# Patient Record
Sex: FEMALE | Race: WHITE | NOT HISPANIC OR LATINO | Employment: UNEMPLOYED | URBAN - METROPOLITAN AREA
[De-identification: names, ages, dates, MRNs, and addresses within clinical notes are randomized per-mention and may not be internally consistent; named-entity substitution may affect disease eponyms.]

---

## 2019-01-01 LAB — PAP SMEAR - HIM PATIENT REPORTED: NEGATIVE

## 2019-07-18 ENCOUNTER — TRANSFERRED RECORDS (OUTPATIENT)
Dept: HEALTH INFORMATION MANAGEMENT | Facility: CLINIC | Age: 49
End: 2019-07-18

## 2020-07-01 LAB — NORMAL RANGE: NORMAL

## 2020-08-11 ENCOUNTER — TRANSFERRED RECORDS (OUTPATIENT)
Dept: HEALTH INFORMATION MANAGEMENT | Facility: CLINIC | Age: 50
End: 2020-08-11

## 2021-05-24 ENCOUNTER — RECORDS - HEALTHEAST (OUTPATIENT)
Dept: ADMINISTRATIVE | Facility: CLINIC | Age: 51
End: 2021-05-24

## 2021-05-26 ENCOUNTER — RECORDS - HEALTHEAST (OUTPATIENT)
Dept: ADMINISTRATIVE | Facility: CLINIC | Age: 51
End: 2021-05-26

## 2021-06-04 LAB — COLONOSCOPY: NORMAL

## 2021-07-05 ENCOUNTER — OFFICE VISIT (OUTPATIENT)
Dept: FAMILY MEDICINE | Facility: CLINIC | Age: 51
End: 2021-07-05
Payer: COMMERCIAL

## 2021-07-05 VITALS
OXYGEN SATURATION: 98 % | WEIGHT: 103.04 LBS | TEMPERATURE: 97.2 F | RESPIRATION RATE: 12 BRPM | HEART RATE: 70 BPM | SYSTOLIC BLOOD PRESSURE: 99 MMHG | BODY MASS INDEX: 17.17 KG/M2 | HEIGHT: 65 IN | DIASTOLIC BLOOD PRESSURE: 64 MMHG

## 2021-07-05 DIAGNOSIS — Z12.31 ENCOUNTER FOR SCREENING MAMMOGRAM FOR BREAST CANCER: ICD-10-CM

## 2021-07-05 DIAGNOSIS — Z00.00 ROUTINE GENERAL MEDICAL EXAMINATION AT A HEALTH CARE FACILITY: Primary | ICD-10-CM

## 2021-07-05 DIAGNOSIS — Z13.220 SCREENING FOR LIPID DISORDERS: ICD-10-CM

## 2021-07-05 DIAGNOSIS — L30.9 DERMATITIS: ICD-10-CM

## 2021-07-05 PROBLEM — F41.9 ANXIETY: Status: ACTIVE | Noted: 2021-07-05

## 2021-07-05 LAB
% GRANULOCYTES: 58.1 %G (ref 40–75)
ANION GAP SERPL CALCULATED.3IONS-SCNC: 4 MMOL/L (ref 3–14)
BUN SERPL-MCNC: 16 MG/DL (ref 7–30)
CALCIUM SERPL-MCNC: 9.1 MG/DL (ref 8.5–10.1)
CHLORIDE SERPL-SCNC: 102 MMOL/L (ref 94–109)
CHOLEST SERPL-MCNC: 174 MG/DL (ref 0–200)
CHOLEST/HDLC SERPL: 2.3 {RATIO} (ref 0–5)
CO2 SERPL-SCNC: 29 MMOL/L (ref 20–32)
CREAT SERPL-MCNC: 0.68 MG/DL (ref 0.52–1.04)
ERYTHROCYTE [DISTWIDTH] IN BLOOD BY AUTOMATED COUNT: 12.1 %
FASTING SPECIMEN: NO
GFR SERPL CREATININE-BSD FRML MDRD: >90 ML/MIN/{1.73_M2}
GLUCOSE SERPL-MCNC: 80 MG/DL (ref 70–99)
GRANULOCYTES #: 3.9 K/UL (ref 1.6–8.3)
HCT VFR BLD AUTO: 37 % (ref 35–47)
HDLC SERPL-MCNC: 76 MG/DL
HEMOGLOBIN: 12.4 G/DL (ref 11.7–15.7)
LDLC SERPL CALC-MCNC: 77 MG/DL (ref 0–129)
LYMPHOCYTES # BLD AUTO: 1.9 K/UL (ref 0.8–5.3)
LYMPHOCYTES NFR BLD AUTO: 27.8 %L (ref 20–48)
MCH RBC QN AUTO: 31.3 PG (ref 26.5–35)
MCHC RBC AUTO-ENTMCNC: 33.5 G/DL (ref 32–36)
MCV RBC AUTO: 93.4 FL (ref 78–100)
MID #: 0.9 K/UL (ref 0–2.2)
MID %: 14.1 %M (ref 0–20)
PLATELET # BLD AUTO: 258 K/UL (ref 150–450)
POTASSIUM SERPL-SCNC: 4.1 MMOL/L (ref 3.4–5.3)
RBC # BLD AUTO: 3.96 M/UL (ref 3.8–5.2)
SODIUM SERPL-SCNC: 135 MMOL/L (ref 133–144)
TRIGL SERPL-MCNC: 105 MG/DL (ref 0–150)
TSH SERPL DL<=0.005 MIU/L-ACNC: 1.85 MU/L (ref 0.4–4)
VLDL-CHOLESTEROL: 21 (ref 7–32)
WBC # BLD AUTO: 6.7 K/UL (ref 4–11)

## 2021-07-05 RX ORDER — DIAZEPAM 5 MG
5 TABLET ORAL EVERY 6 HOURS PRN
COMMUNITY
End: 2023-09-18

## 2021-07-05 RX ORDER — FLUOXETINE 20 MG/1
20 TABLET, FILM COATED ORAL DAILY
COMMUNITY
End: 2022-07-11

## 2021-07-05 RX ORDER — MECLIZINE HYDROCHLORIDE 25 MG/1
25 TABLET ORAL 3 TIMES DAILY PRN
COMMUNITY
End: 2022-03-30

## 2021-07-05 ASSESSMENT — PAIN SCALES - GENERAL: PAINLEVEL: NO PAIN (0)

## 2021-07-05 ASSESSMENT — MIFFLIN-ST. JEOR: SCORE: 1078.27

## 2021-07-05 NOTE — PROGRESS NOTES
SUBJECTIVE:   CC: Bette Celeste is an 50 year old woman who presents for preventive health visit.     Patient has been advised of split billing requirements and indicates understanding: Yes  Healthy Habits:    Do you get at least three servings of calcium containing foods daily (dairy, green leafy vegetables, etc.)? yes    Amount of exercise or daily activities, outside of work: 3-5 day(s) per week    Problems taking medications regularly not applicable    Medication side effects: No    Have you had an eye exam in the past two years? yes    Do you see a dentist twice per year? yes    Do you have sleep apnea, excessive snoring or daytime drowsiness?no      PROBLEMS TO ADD ON...  Itchy scalp  - notes this was an issue back in [place]  - eventually determined she had lice on two separate occasions  - the lice was not initially identified and it sounds like there were other treatments tried including likely antifungals and steroids before if was determined that she had lice  - currently there is no evidence of lice  - no visible rash  - but the itching can be very frustrating    Today's PHQ-2 Score:   PHQ-2 ( 1999 [employer]) 7/5/2021   Q1: Little interest or pleasure in doing things 0   Q2: Feeling down, depressed or hopeless 0   PHQ-2 Score 0       Abuse: Current or Past(Physical, Sexual or Emotional)- No  Do you feel safe in your environment? Yes        Social History     Tobacco Use     Smoking status: Not on file   Substance Use Topics     Alcohol use: Not on file     If you drink alcohol do you typically have >3 drinks per day or >7 drinks per week? No                     Reviewed orders with patient.  Reviewed health maintenance and updated orders accordingly - Yes    Mammogram Screening: Recommended annual mammography  Pertinent mammograms are reviewed under the imaging tab.  Order for mammogram placed today.     Pertinent mammograms are reviewed under the imaging tab.  History of abnormal Pap smear: NO - age  "30-65 PAP every 5 years with negative HPV co-testing recommended  Per patient, her last PAP was negative in 2019. She does not know if co-testing for HPV was done at the time.      Reviewed and updated as needed this visit by clinical staff  Tobacco  Allergies  Meds  Problems  Med Hx  Surg Hx  Fam Hx  Soc Hx          Reviewed and updated as needed this visit by Provider  Tobacco  Allergies  Meds  Problems  Med Hx  Surg Hx  Fam Hx         Past Medical History:   Diagnosis Date     Arnold-Chiari malformation, type II (H)      Vertigo       Past Surgical History:   Procedure Laterality Date     BRAIN SURGERY  1990    Arnold Chiari correction in 1990 and later hernia repair in 2008       ROS:  CONSTITUTIONAL: NEGATIVE for fever, chills, change in weight  INTEGUMENTARY/SKIN: Itching scalp as noted above. Otherwise NEGATIVE for worrisome rashes, moles or lesions  EYES: NEGATIVE for vision changes or irritation  ENT: NEGATIVE for ear, mouth and throat problems  RESP: NEGATIVE for significant cough or SOB  BREAST: NEGATIVE for masses, tenderness or discharge  CV: NEGATIVE for chest pain, palpitations or peripheral edema  GI: NEGATIVE for nausea, abdominal pain, heartburn, or change in bowel habits  : NEGATIVE for unusual urinary or vaginal symptoms. No vaginal bleeding.  MUSCULOSKELETAL: NEGATIVE for significant arthralgias or myalgia  NEURO: NEGATIVE for weakness, dizziness or paresthesias  PSYCHIATRIC: NEGATIVE for changes in mood or affect     OBJECTIVE:   BP 99/64 (BP Location: Left arm, Patient Position: Chair, Cuff Size: Adult Regular)   Pulse 70   Temp 97.2  F (36.2  C) (Temporal)   Resp 12   Ht 1.651 m (5' 5\")   Wt 46.7 kg (103 lb 0.6 oz)   LMP 05/01/2019   SpO2 98%   Breastfeeding No   BMI 17.15 kg/m    EXAM:  GENERAL APPEARANCE: healthy, alert and no distress  EYES: Eyes grossly normal to inspection, PERRL and conjunctivae and sclerae normal  HENT: ear canals and TM's normal, nose and " mouth without ulcers or lesions, oropharynx clear and oral mucous membranes moist  NECK: no adenopathy, no asymmetry, masses, or scars and thyroid normal to palpation  RESP: lungs clear to auscultation - no rales, rhonchi or wheezes  BREAST: normal without masses, tenderness or nipple discharge and no palpable axillary masses or adenopathy  CV: regular rate and rhythm, normal S1 S2, no S3 or S4, no murmur, click or rub, no peripheral edema and peripheral pulses strong  ABDOMEN: soft, nontender, no hepatosplenomegaly, no masses and bowel sounds normal  MS: no musculoskeletal defects are noted and gait is age appropriate without ataxia  SKIN: no suspicious lesions or rashes  NEURO: Normal strength and tone, sensory exam grossly normal, mentation intact and speech normal  PSYCH: mentation appears normal and affect normal/bright    Diagnostic Test Results:  Labs reviewed in Epic    ASSESSMENT/PLAN:   Bette was seen today for new patient, physical and derm problem.    Diagnoses and all orders for this visit:    Routine general medical examination at a health care facility  -     CBC with Diff Plt ([facility])  -     TSH with free T4 reflex    Screening for lipid disorders  -     Lipid Panel ([facility])  -     Basic metabolic panel    Encounter for screening mammogram for breast cancer  -     Mammogram - routine screening; Future    Dermatitis  -     ADULT DERMATOLOGY REFERRAL; Future    Scalp does not appear particularly inflamed today although patient does endorse feeling itchy. History or previous confusion with diagnosis and eventual diagnosis of lice x2 without apparent spread of infection to family member is curious. I considered possible trial of an azole +/- steroid but will defer to dermatology at this point.     Patient has been advised of split billing requirements and indicates understanding: Yes  COUNSELING:   Reviewed preventive health counseling, as reflected in patient instructions    Estimated body mass index is  "17.15 kg/m  as calculated from the following:    Height as of this encounter: 1.651 m (5' 5\").    Weight as of this encounter: 46.7 kg (103 lb 0.6 oz).    She reports that she has never smoked. She has never used smokeless tobacco.      Counseling Resources:  ATP IV Guidelines  Pooled Cohorts Equation Calculator  Breast Cancer Risk Calculator  BRCA-Related Cancer Risk Assessment: FHS-7 Tool  FRAX Risk Assessment  ICSI Preventive Guidelines  Dietary Guidelines for Americans, 2010  USDA's MyPlate  ASA Prophylaxis  Lung CA Screening    Alexis Chatman MD  [facility]  "

## 2021-07-05 NOTE — LETTER
July 6, 2021      Bette Celeste  [address] [address]        Bernardo Amos,     Here are the results from your recent labs in clinic. Everything looks good, I have no concerns based on these results. I didn't see anything to indicate why you might be feeling your scalp to be so itchy. I'll be curious to hear what dermatology has to say. Feel free to contact the clinic or you can contact me directly through the [employer] portal we talked about.     Alexis Gonzales MD   8:45 AM, July 6, 2021     Resulted Orders   Lipid Panel ([facility])   Result Value Ref Range    FASTING SPECIMEN no     Cholesterol 174.0 0.0 - 200.0    HDL Cholesterol 76.0 >50.0    Triglycerides 105.0 0.0 - 150.0    Cholesterol/HDL Ratio 2.3 0.0 - 5.0    LDL Cholesterol Direct 77.0 0.0 - 129.0    VLDL-Cholesterol 21.0 7.0 - 32.0   Basic metabolic panel   Result Value Ref Range    Sodium 135 133 - 144 mmol/L    Potassium 4.1 3.4 - 5.3 mmol/L    Chloride 102 94 - 109 mmol/L    Carbon Dioxide 29 20 - 32 mmol/L    Anion Gap 4 3 - 14 mmol/L    Glucose 80 70 - 99 mg/dL    Urea Nitrogen 16 7 - 30 mg/dL    Creatinine 0.68 0.52 - 1.04 mg/dL    GFR Estimate >90 >60 mL/min/[1.73_m2]      Comment:      Non  GFR Calc  Starting 12/18/2018, serum creatinine based estimated GFR (eGFR) will be   calculated using the Chronic Kidney Disease Epidemiology Collaboration   (CKD-EPI) equation.      GFR Estimate If Black >90 >60 mL/min/[1.73_m2]      Comment:       GFR Calc  Starting 12/18/2018, serum creatinine based estimated GFR (eGFR) will be   calculated using the Chronic Kidney Disease Epidemiology Collaboration   (CKD-EPI) equation.      Calcium 9.1 8.5 - 10.1 mg/dL   CBC with Diff Plt ([facility])   Result Value Ref Range    WBC 6.7 4.0 - 11.0 K/uL    Lymphocytes # 1.9 0.8 - 5.3 K/uL    % Lymphocytes 27.8 20.0 - 48.0 %L    Mid # 0.9 0.0 - 2.2 K/uL    Mid % 14.1 0.0 - 20.0 %M    GRANULOCYTES # 3.9 1.6 - 8.3 K/uL    %  Granulocytes 58.1 40.0 - 75.0 %G    RBC 3.96 3.80 - 5.20 M/uL    Hemoglobin 12.4 11.7 - 15.7 g/dL    Hematocrit 37.0 35.0 - 47.0 %    MCV 93.4 78.0 - 100.0 fL    MCH 31.3 26.5 - 35.0 pg    MCHC 33.5 32.0 - 36.0 g/dL    Platelets 258.0 150.0 - 450.0 K/uL    RDW 12.1 %   TSH with free T4 reflex   Result Value Ref Range    TSH 1.85 0.40 - 4.00 mU/L

## 2021-07-05 NOTE — NURSING NOTE
"50 year old  Chief Complaint   Patient presents with     New Patient     Physical     patient is not fasting.     Derm Problem     complains itchy scalp that has been an on going issues.       Blood pressure 99/64, pulse 70, temperature 97.2  F (36.2  C), temperature source Temporal, resp. rate 12, height 1.651 m (5' 5\"), weight 46.7 kg (103 lb 0.6 oz), last menstrual period 05/01/2019, SpO2 98 %, not currently breastfeeding. Body mass index is 17.15 kg/m .  There is no problem list on file for this patient.      Wt Readings from Last 2 Encounters:   07/05/21 46.7 kg (103 lb 0.6 oz)     BP Readings from Last 3 Encounters:   07/05/21 99/64         Current Outpatient Medications   Medication     B Complex Vitamins (B COMPLEX PO)     Calcium Citrate-Vitamin D (CALCIUM + D PO)     FLUoxetine 20 MG tablet     No current facility-administered medications for this visit.        Social History     Tobacco Use     Smoking status: Never Smoker     Smokeless tobacco: Never Used   Substance Use Topics     Alcohol use: Yes     Drug use: Never       Health Maintenance Due   Topic Date Due     PREVENTIVE CARE VISIT  Never done     ADVANCE CARE PLANNING  Never done     COLORECTAL CANCER SCREENING  Never done     HIV SCREENING  Never done     HEPATITIS C SCREENING  Never done     DTAP/TDAP/TD IMMUNIZATION (1 - Tdap) Never done     LIPID  Never done     ZOSTER IMMUNIZATION (1 of 2) Never done     MAMMO SCREENING  07/01/2021     PAP  01/01/2022       No results found for: PAP      Melvi Shipley CMA, CMA  July 5, 2021 1:12 PM  "

## 2021-08-01 ENCOUNTER — TRANSFERRED RECORDS (OUTPATIENT)
Dept: HEALTH INFORMATION MANAGEMENT | Facility: CLINIC | Age: 51
End: 2021-08-01

## 2021-08-05 ENCOUNTER — ANCILLARY PROCEDURE (OUTPATIENT)
Dept: MAMMOGRAPHY | Facility: CLINIC | Age: 51
End: 2021-08-05
Attending: FAMILY MEDICINE
Payer: COMMERCIAL

## 2021-08-05 DIAGNOSIS — Z12.31 ENCOUNTER FOR SCREENING MAMMOGRAM FOR BREAST CANCER: ICD-10-CM

## 2021-08-05 PROCEDURE — 77067 SCR MAMMO BI INCL CAD: CPT | Mod: GC

## 2021-08-05 PROCEDURE — 77063 BREAST TOMOSYNTHESIS BI: CPT | Mod: GC

## 2021-09-24 ENCOUNTER — TELEPHONE (OUTPATIENT)
Dept: GENERAL RADIOLOGY | Facility: CLINIC | Age: 51
End: 2021-09-24

## 2021-09-24 DIAGNOSIS — Z80.3 FAMILY HISTORY OF MALIGNANT NEOPLASM OF BREAST: Primary | ICD-10-CM

## 2021-09-24 NOTE — TELEPHONE ENCOUNTER
Received call from patient regarding high risk screening mammogram result letter. Discussed option to speak with genetic counseling or high risk cancer management clinic.

## 2021-09-26 ENCOUNTER — DOCUMENTATION ONLY (OUTPATIENT)
Dept: ONCOLOGY | Facility: CLINIC | Age: 51
End: 2021-09-26

## 2021-09-26 NOTE — PROGRESS NOTES
Writer received referral, reviewed for appropriate plan, and sent to New Patient Scheduling for completion.

## 2021-10-29 ENCOUNTER — OFFICE VISIT (OUTPATIENT)
Dept: FAMILY MEDICINE | Facility: CLINIC | Age: 51
End: 2021-10-29
Attending: FAMILY MEDICINE
Payer: COMMERCIAL

## 2021-10-29 VITALS — SYSTOLIC BLOOD PRESSURE: 98 MMHG | DIASTOLIC BLOOD PRESSURE: 58 MMHG

## 2021-10-29 DIAGNOSIS — L30.9 DERMATITIS: ICD-10-CM

## 2021-10-29 PROCEDURE — 99204 OFFICE O/P NEW MOD 45 MIN: CPT | Performed by: DERMATOLOGY

## 2021-10-29 RX ORDER — DESONIDE 0.5 MG/G
CREAM TOPICAL 2 TIMES DAILY
Qty: 60 G | Refills: 3 | Status: SHIPPED | OUTPATIENT
Start: 2021-10-29 | End: 2022-03-30

## 2021-10-29 RX ORDER — FLUOCINONIDE TOPICAL SOLUTION USP, 0.05% 0.5 MG/ML
SOLUTION TOPICAL 2 TIMES DAILY
Qty: 60 ML | Refills: 3 | Status: SHIPPED | OUTPATIENT
Start: 2021-10-29 | End: 2022-03-30

## 2021-10-29 NOTE — PROGRESS NOTES
[facility] Dermatology Clinic Note - EP    Encounter Date: Oct 29, 2021    Dermatology Problem List:  #. 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 Discussed natural etiology and prognosis of disease. Discussed various treatment options including capsaicin cream vs topical gabapentin vs topical steroid. Reassured that there are very few   - Start Lidex 0.05% solution BID for a few weeks at a time only    Procedures Performed:   None    Follow-up: 3 month(s) in-person, or earlier for new or changing lesions    Scribe Disclosure:  I, Marlene Glez, am serving as a scribe to document services personally performed by Jian Che MD based on data collection and the provider's statements to me.     Provider Disclosure:  The documentation recorded by the scribe accurately reflects the services I personally performed and the decisions made by me.    Staff:   Jian Che MD  Dermatology/Dermatopathology Staff Physician  , Department of Dermatology    ____________________________________________    CC: Derm Problem (Itchy neck and base of the skull area.)      HPI:  Ms. Bette Celeste is a(n) 50 year old female who presents today as a new patient for a rash. The patient was referred to dermatology by Dr. Chatman.     Today, the patient reports itching on her scalp for months. She states that this has been spreading down to her posterior neck more recently. Currently, she is using Aquafor occasionally. She has also previously used Vanicream and a medicated cream, although does not remember the name. There use to be more sores, though these are largely resolved. She is still noticing itching on the left side of her neck. This has  not previously been only on the left side. She underwent patch testing while in [place], although did not reveal any allergies.     Pertinent hx:  Chiari malformation type 2 s/p brain surgery in 1993. 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    Patient is otherwise feeling well, without additional skin concerns.     Labs Reviewed:  N/A    Physical Exam:  Vitals: BP 98/58   SKIN: Focused examination of scalp and posterior neck.  - Eczematous dermatits of left neck underneath jaw line.   - Rare excoriations on the left occipital scalp near the posterior hair line.  - No other lesions of concern on areas examined.     Medications:  Current Outpatient Medications   Medication     B Complex Vitamins (B COMPLEX PO)     Calcium Citrate-Vitamin D (CALCIUM + D PO)     diazepam (VALIUM) 5 MG tablet     FLUoxetine 20 MG tablet     meclizine (ANTIVERT) 25 MG tablet     No current facility-administered medications for this visit.        Past Medical History:   Patient Active Problem List   Diagnosis     Anxiety     Past Medical History:   Diagnosis Date     Arnold-Chiari malformation, type II (H)      Vertigo        CC Alexis Chatman MD  [address] [place] [address] on close of this encounter.    This note has been created using voice recognition software; while it has been reviewed, some errors may persist.

## 2021-10-29 NOTE — LETTER
10/29/2021         RE: Bette Celeste  [address]  [place] [address]        Dear Colleague,    Thank you for referring your patient, Bette Celeste, to the [facility]. Please see a copy of my visit note below.    [facility] Dermatology Clinic Note - EP    Encounter Date: Oct 29, 2021    Dermatology Problem List:  #. 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 Discussed natural etiology and prognosis of disease. Discussed various treatment options including capsaicin cream vs topical gabapentin vs topical steroid. Reassured that there are very few   - Start Lidex 0.05% solution BID for a few weeks at a time only    Procedures Performed:   None    Follow-up: 3 month(s) in-person, or earlier for new or changing lesions    Scribe Disclosure:  I, Marlene Glez, am serving as a scribe to document services personally performed by Jian Che MD based on data collection and the provider's statements to me.     Provider Disclosure:  The documentation recorded by the scribe accurately reflects the services I personally performed and the decisions made by me.    Staff:   Jian Che MD  Dermatology/Dermatopathology Staff Physician  , Department of Dermatology    ____________________________________________    CC: Derm Problem (Itchy neck and base of the skull area.)      HPI:  Ms. Bette Celeste is a(n) 50 year old female who presents today as a new patient for a rash. The patient was referred to dermatology by Dr. Chatman.     Today, the patient reports itching on her scalp for months. She states that this has been spreading down to her posterior neck more recently. Currently, she is  using Aquafor occasionally. She has also previously used Vanicream and a medicated cream, although does not remember the name. There use to be more sores, though these are largely resolved. She is still noticing itching on the left side of her neck. This has not previously been only on the left side. She underwent patch testing while in [place], although did not reveal any allergies.     Pertinent hx:  Chiari malformation type 2 s/p brain surgery in 1993. 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    Patient is otherwise feeling well, without additional skin concerns.     Labs Reviewed:  N/A    Physical Exam:  Vitals: BP 98/58   SKIN: Focused examination of scalp and posterior neck.  - Eczematous dermatits of left neck underneath jaw line.   - Rare excoriations on the left occipital scalp near the posterior hair line.  - No other lesions of concern on areas examined.     Medications:  Current Outpatient Medications   Medication     B Complex Vitamins (B COMPLEX PO)     Calcium Citrate-Vitamin D (CALCIUM + D PO)     diazepam (VALIUM) 5 MG tablet     FLUoxetine 20 MG tablet     meclizine (ANTIVERT) 25 MG tablet     No current facility-administered medications for this visit.        Past Medical History:   Patient Active Problem List   Diagnosis     Anxiety     Past Medical History:   Diagnosis Date     Arnold-Chiari malformation, type II (H)      Vertigo        CC Alexis Chatman MD  [address] [place] [address] on close of this encounter.    This note has been created using voice recognition software; while it has been reviewed, some errors may persist.         Again, thank you for allowing me to participate in the care of your patient.        Sincerely,        Jian Che MD

## 2021-11-19 ENCOUNTER — VIRTUAL VISIT (OUTPATIENT)
Dept: ONCOLOGY | Facility: CLINIC | Age: 51
End: 2021-11-19
Attending: FAMILY MEDICINE
Payer: COMMERCIAL

## 2021-11-19 DIAGNOSIS — Z80.3 FAMILY HISTORY OF MALIGNANT NEOPLASM OF BREAST: ICD-10-CM

## 2021-11-19 DIAGNOSIS — Z80.0 FAMILY HISTORY OF PANCREATIC CANCER: Primary | ICD-10-CM

## 2021-11-19 PROCEDURE — 96040 HC GENETIC COUNSELING, EACH 30 MINUTES: CPT | Mod: GT,95 | Performed by: GENETIC COUNSELOR, MS

## 2021-11-19 NOTE — PROGRESS NOTES
11/19/2021    Referring Provider: Alexis Chatman MD    Presenting Information:   I met with Bette Celeste today for genetic counseling at the Cancer Risk Management Program (video visit) to discuss her family history of breast and pancreatic cancer.  She is here today to review this history, cancer screening recommendations, and available genetic testing options.    Personal History:  Bette is a 50 year old female. She does not have any personal history of cancer.  She had her first menstrual period at age 13-14, her first child at age 34, and stopped having menstrual periods in May of 2019.  She has two biological children.  Bette has her ovaries, fallopian tubes and uterus in place.  Her most recent annual mammogram was 8/5/2021 and noted extreme densities.  Her most recent colonoscopy from June 2021 was negative, and follow up was recommended in 10 years.  She has routine eye exams, and has seen a dermatologist.  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       Family History: (Please see scanned pedigree for detailed family history information)    Her mother was diagnosed with pancreatic cancer and passed away in her late 70's    Her maternal aunt was diagnosed with breast cancer at age 39 and eye cancer in her 40's.  She completed BRCA1/2 genetic testing as part of a 25 gene hereditary cancer panel in 2014 which identified one variant of uncertain significance in the RAD51D gene.      Two maternal first cousins (through Bette's aunt who was diagnosed with breast cancer) also have a history of breast cancer.  One cousin was diagnosed with breast cancer at age 39, and has completed genetic testing in 2020 for a 53 gene panel which was negative.  Her sister, who was diagnosed with breast cancer at age 51, was found to carry a RAD51D variant of uncertain significance.     Her maternal grandmother was diagnosed with breast cancer in her 70's, and passed away at age 77    Bette's father had a history of  multiple myeloma diagnosed in his 70's    Her maternal ethnicity is [place]. Her paternal ethnicity is /English/[place].      Discussion:    Bette's family history of pancreatic and breast cancer is suggestive of a hereditary cancer syndrome.    We reviewed the features of sporadic, familial, and hereditary cancers. Based on her family history, Bette meets current National Comprehensive Cancer Network (NCCN) criteria for genetic testing of high penetrance breast and pancreatic cancer susceptibility genes.  Per NCCN guidelines, this testing includes BRCA1, BRCA2, PALB2, PTEN, TP53, CDH1, ISHA, CDKN2A, MLH1, MSH2, MSH6, EPCAM, STK11.        We discussed the natural history and genetics of hereditary breast and ovarian cancer syndrome caused by mutations in the BRCA1/2 genes. We discussed that there are additional genes that could cause increased risk for breast, pancreatic, and other cancers. As many of these genes present with overlapping features in a family and accurate cancer risk cannot always be established based upon the pedigree analysis alone, it would be reasonable for Bette to consider panel genetic testing to analyze multiple genes at once.    A detailed handout regarding hereditary breast cancer and the information we discussed was provided to Bette at the end of our appointment today and can be found in the after visit summary. Topics included: inheritance pattern, cancer risks, cancer screening recommendations, and also risks, benefits and limitations of testing.    We discussed that genetic testing for cancer susceptibility genes is typically most informative, though, when it is first performed on a family member with a personal history of  cancer. In this situation, we discussed that Bette's maternal aunt and cousins would be the best relatives to test first for breast cancer susceptibility genes.  Given their negative test results, this reduces the likelihood of detecting a breast  cancer susceptibility gene in Bette.  Due to her family history of pancreatic cancer and breast cancer, testing is available to Bette, but with limitations. If Bette pursues testing at this time and receives a negative result, this does not rule out the possibility of a hereditary cancer syndrome in her and/or her family.    Bette expressed interest in genetic testing today for a panel of genes related to the cancers in her family.  Verbal consent was obtained, and genetic testing for BRCA1/2 with the CustomNext-Cancer gene panel (CancerNext+MelanomaNext) will be sent through [facility]: APC, ISHA, AXIN2, BAP1, BARD1, BMPR1A, BRCA1, BRCA2, BRIP1, CDH1, CDK4, CDKN2A, CHEK2, DICER1, EPCAM, GREM1, HOXB13, MITF, MLH1, MSH2, MSH3, MSH6, MUTYH, NBN, NF1, NTHL1, PALB2, PMS2, POLD1, POLE, POT1, PTEN, RAD51C, RAD51D, RB1, RECQL, SMAD4, SMARCA4, STK11, TP53.  A saliva kit will be provided by mail.  Test turn around time: 3-4 weeks.     Medical Management: For Bette, we reviewed that the information from genetic testing may determine:    additional cancer screening for which Bette may qualify (i.e. mammogram and breast MRI, more frequent colonoscopies, more frequent dermatologic exams, etc.),    options for risk reducing surgeries Bette could consider (i.e. bilateral mastectomy, surgery to remove her ovaries and/or uterus, etc.),      and targeted chemotherapies for certain cancers in the future (i.e. immunotherapy for individuals with Corcoran syndrome, PARP inhibitors, etc.).     These recommendations  will be discussed in detail once genetic testing is completed.     Plan:  1) Today Bette elected to proceed with a custom panel of genes related to the cancers in her family (CustomNext-Cancer, a combination of CancerNext and MelanomaNext).  2) This information should be available in 4 weeks.  3) Bette will return to clinic to discuss the results.    Face to face time: 55 minutes    Olga Padilla MS, CGC  Licensed, Certified  Genetic Counselor  [facility]  Phone: [phone]

## 2021-11-19 NOTE — LETTER
11/19/2021         RE: Bette Celeste  [address]  [place] [address]        Dear Colleague,    Thank you for referring your patient, Bette Celeste, to the [facility]. Please see a copy of my visit note below.    11/19/2021    Referring Provider: Alexis Chatman MD    Presenting Information:   I met with Bette Celeste today for genetic counseling at the Cancer Risk Management Program (video visit) to discuss her family history of breast and pancreatic cancer.  She is here today to review this history, cancer screening recommendations, and available genetic testing options.    Personal History:  Bette is a 50 year old female. She does not have any personal history of cancer.  She had her first menstrual period at age 13-14, her first child at age 34, and stopped having menstrual periods in May of 2019.  She has two biological children.  Bette has her ovaries, fallopian tubes and uterus in place.  Her most recent annual mammogram was 8/5/2021 and noted extreme densities.  Her most recent colonoscopy from June 2021 was negative, and follow up was recommended in 10 years.  She has routine eye exams, and has seen a dermatologist.  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       Family History: (Please see scanned pedigree for detailed family history information)    Her mother was diagnosed with pancreatic cancer and passed away in her late 70's    Her maternal aunt was diagnosed with breast cancer at age 39 and eye cancer in her 40's.  She completed BRCA1/2 genetic testing as part of a 25 gene hereditary cancer panel in 2014 which identified one variant of uncertain significance in the RAD51D gene.      Two maternal first cousins (through Bette's aunt who was diagnosed with breast cancer) also have a history of breast cancer.  One cousin was diagnosed with breast cancer at age 39, and has completed genetic testing in 2020 for a 53 gene panel which was negative.  Her  sister, who was diagnosed with breast cancer at age 51, was found to carry a RAD51D variant of uncertain significance.     Her maternal grandmother was diagnosed with breast cancer in her 70's, and passed away at age 77    Bette's father had a history of multiple myeloma diagnosed in his 70's    Her maternal ethnicity is Amharic/Croatian. Her paternal ethnicity is /English/Kinyarwanda.      Discussion:    Bette's family history of pancreatic and breast cancer is suggestive of a hereditary cancer syndrome.    We reviewed the features of sporadic, familial, and hereditary cancers. Based on her family history, Bette meets current National Comprehensive Cancer Network (NCCN) criteria for genetic testing of high penetrance breast and pancreatic cancer susceptibility genes.  Per NCCN guidelines, this testing includes BRCA1, BRCA2, PALB2, PTEN, TP53, CDH1, ISHA, CDKN2A, MLH1, MSH2, MSH6, EPCAM, STK11.        We discussed the natural history and genetics of hereditary breast and ovarian cancer syndrome caused by mutations in the BRCA1/2 genes. We discussed that there are additional genes that could cause increased risk for breast, pancreatic, and other cancers. As many of these genes present with overlapping features in a family and accurate cancer risk cannot always be established based upon the pedigree analysis alone, it would be reasonable for Bette to consider panel genetic testing to analyze multiple genes at once.    A detailed handout regarding hereditary breast cancer and the information we discussed was provided to Bette at the end of our appointment today and can be found in the after visit summary. Topics included: inheritance pattern, cancer risks, cancer screening recommendations, and also risks, benefits and limitations of testing.    We discussed that genetic testing for cancer susceptibility genes is typically most informative, though, when it is first performed on a family member with a personal  history of  cancer. In this situation, we discussed that Bette's maternal aunt and cousins would be the best relatives to test first for breast cancer susceptibility genes.  Given their negative test results, this reduces the likelihood of detecting a breast cancer susceptibility gene in Bette.  Due to her family history of pancreatic cancer and breast cancer, testing is available to Bette, but with limitations. If Bette pursues testing at this time and receives a negative result, this does not rule out the possibility of a hereditary cancer syndrome in her and/or her family.    Bette expressed interest in genetic testing today for a panel of genes related to the cancers in her family.  Verbal consent was obtained, and genetic testing for BRCA1/2 with the CustomNext-Cancer gene panel (CancerNext+MelanomaNext) will be sent through [facility]: APC, ISHA, AXIN2, BAP1, BARD1, BMPR1A, BRCA1, BRCA2, BRIP1, CDH1, CDK4, CDKN2A, CHEK2, DICER1, EPCAM, GREM1, HOXB13, MITF, MLH1, MSH2, MSH3, MSH6, MUTYH, NBN, NF1, NTHL1, PALB2, PMS2, POLD1, POLE, POT1, PTEN, RAD51C, RAD51D, RB1, RECQL, SMAD4, SMARCA4, STK11, TP53.  A saliva kit will be provided by mail.  Test turn around time: 3-4 weeks.     Medical Management: For Bette, we reviewed that the information from genetic testing may determine:    additional cancer screening for which Bette may qualify (i.e. mammogram and breast MRI, more frequent colonoscopies, more frequent dermatologic exams, etc.),    options for risk reducing surgeries Bette could consider (i.e. bilateral mastectomy, surgery to remove her ovaries and/or uterus, etc.),      and targeted chemotherapies for certain cancers in the future (i.e. immunotherapy for individuals with Corcoran syndrome, PARP inhibitors, etc.).     These recommendations  will be discussed in detail once genetic testing is completed.     Plan:  1) Today Bette elected to proceed with a custom panel of genes related to the cancers in her  family (CustomNext-Cancer, a combination of CancerNext and MelanomaNext).  2) This information should be available in 4 weeks.  3) Bette will return to clinic to discuss the results.    Face to face time: 55 minutes    Olga Padilla MS, [facility]  Licensed, Certified Genetic Counselor  [facility]  Phone: [phone]

## 2021-11-23 NOTE — PATIENT INSTRUCTIONS
Assessing Cancer Risk  Only about 5-10% of cancers are thought to be due to an inherited cancer susceptibility gene.    These families often have:    Several people with the same or related types of cancer    Cancers diagnosed at a young age (before age 50)    Individuals with more than one primary cancer    Multiple generations of the family affected with cancer    Some people may be candidates for genetic testing of more than one gene.  For these families, genetic testing using a cancer panel may be offered.  These panels will test different genes known to increase the risk for breast, ovarian, uterine, and/or other cancers. All of the genes discussed below have published clinical management guidelines for individuals who are found to carry a mutation. The purpose of this handout is to serve as a brief summary of the genes analyzed by the panels used to inquire about hereditary breast and gynecologic cancer:  ISHA, BRCA1, BRCA2, BRIP1, CDH1, CHEK2, MLH1, MSH2, MSH6, PMS2, EPCAM, PTEN, PALB2, RAD51C, RAD51D, and TP53.  ______________________________________________________________________________  Hereditary Breast and Ovarian Cancer Syndrome   (BRCA1 and BRCA2)  A single mutation in one of the copies of BRCA1 or BRCA2 increases the risk for breast and ovarian cancer, among others.  The risk for pancreatic cancer and melanoma may also be slightly increased in some families.  The chart below shows the chance that someone with a BRCA mutation would develop cancer in his or her lifetime1,2,3,4.        A person s ethnic background is also important to consider, as individuals of Ashkenazi Zoroastrian ancestry have a higher chance of having a BRCA gene mutation.  There are three BRCA mutations that occur more frequently in this population.    Corcoran Syndrome   (MLH1, MSH2, MSH6, PMS2, and EPCAM)  Currently five genes are known to cause Corcoran Syndrome: MLH1, MSH2, MSH6, PMS2, and EPCAM.  A single mutation in one of the  Corcoran Syndrome genes increases the risk for colon, endometrial, ovarian, and stomach cancers.  Other cancers that occur less commonly in Corcoran Syndrome include urinary tract, skin, and brain cancers.  The chart below shows the chance that a person with Corcoran syndrome would develop cancer in his or her lifetime5.      *Cancer risk varies depending on Corcoran syndrome gene found    Cowden Syndrome   (PTEN)  Cowden syndrome is a hereditary condition that increases the risk for breast, thyroid, endometrial, colon, and kidney cancer.  Cowden syndrome is caused by a mutation in the PTEN gene.  A single mutation in one of the copies of PTEN causes Cowden syndrome and increases cancer risk.  The chart below shows the chance that someone with a PTEN mutation would develop cancer in their lifetime6,7.  Other benign features seen in some individuals with Cowden syndrome include benign skin lesions (facial papules, keratoses, lipomas), learning disability, autism, thyroid nodules, colon polyps, and larger head size.      *One recent study found breast cancer risk to be increased to 85%    Li-Fraumeni Syndrome   (TP53)  Li-Fraumeni Syndrome (LFS) is a cancer predisposition syndrome caused by a mutation in the TP53 gene. A single mutation in one of the copies of TP53 increases the risk for multiple cancers. Individuals with LFS are at an increased risk for developing cancer at a young age. The lifetime risk for development of a LFS-associated cancer is 50% by age 30 and 90% by age 60.   Core Cancers: Sarcomas, Breast, Brain, Lung, Leukemias/Lymphomas, Adrenocortical carcinomas  Other Cancers: Gastrointestinal, Thyroid, Skin, Genitourinary    Hereditary Diffuse Gastric Cancer   (CDH1)  Currently, one gene is known to cause hereditary diffuse gastric cancer (HDGC): CDH1.  Individuals with HDGC are at increased risk for diffuse gastric cancer and lobular breast cancer. Of people diagnosed with HDGC, 30-50% have a mutation in the CDH1  gene.  This suggests there are likely other genes that may cause HDGC that have not been identified yet.      Lifetime Cancer Risks    General Population HDGC    Diffuse Gastric  <1% ~80%   Breast 12% 39-52%         Additional Genes  ISHA  ISHA is a moderate-risk breast cancer gene. Women who have a mutation in ISHA can have between a 2-4 fold increased risk for breast cancer compared to the general population8. ISHA mutations have also been associated with increased risk for pancreatic cancer, however an estimate of this cancer risk is not well understood9. Individuals who inherit two ISHA mutations have a condition called ataxia-telangiectasia (AT).  This rare autosomal recessive condition affects the nervous system and immune system, and is associated with progressive cerebellar ataxia beginning in childhood.  Individuals with ataxia-telangiectasia often have a weakened immune system and have an increased risk for childhood cancers.    PALB2  Mutations in PALB2 have been shown to increase the risk of breast cancer up to 33-58% in some families; where individuals fall within this risk range is dependent upon family [id number]. PALB2 mutations have also been associated with increased risk for pancreatic cancer, although this risk has not been quantified yet.  Individuals who inherit two PALB2 mutations--one from their mother and one from their father--have a condition called Fanconi Anemia.  This rare autosomal recessive condition is associated with short stature, developmental delay, bone marrow failure, and increased risk for childhood cancers.    CHEK2   CHEK2 is a moderate-risk breast cancer gene.  Women who have a mutation in CHEK2 have around a 2-fold increased risk for breast cancer compared to the general population, and this risk may be higher depending upon family history.11,12,13 Mutations in CHEK2 have also been shown to increase the risk of a number of other cancers, including colon and prostate, however  these cancer risks are currently not well understood.    BRIP1, RAD51C and RAD51D  Mutations in BRIP1, RAD51C, and RAD51D have been shown to increase the risk of ovarian cancer and possibly female breast cancer as well14,15 .       Lifetime Cancer Risk    General Population BRIP1 RAD51C RAD51D   Ovarian 1-2% ~5-8% ~5-9% ~7-15%           Inheritance  All of the cancer syndromes reviewed above are inherited in an autosomal dominant pattern.  This means that if a parent has a mutation, each of his or her children will have a 50% chance of inheriting that same mutation.  Therefore, each child--male or female--would have a 50% chance of being at increased risk for developing cancer.      Image obtained from Genetics Home Reference, 2013     Mutations in some genes can occur de deena, which means that a person s mutation occurred for the first time in them and was not inherited from a parent.  Now that they have the mutation, however, it can be passed on to future generations.    Genetic Testing  Genetic testing involves a blood test and will look at the genetic information in the ISHA, BRCA1, BRCA2, BRIP1, CDH1, CHEK2, MLH1, MSH2, MSH6, PMS2, EPCAM, PTEN, PALB2, RAD51C, RAD51D, and TP53 genes for any harmful mutations that are associated with increased cancer risk.  If possible, it is recommended that the person(s) who has had cancer be tested before other family members.  That person will give us the most useful information about whether or not a specific gene is associated with the cancer in the family.    Results  There are three possible results of genetic testing:    Positive--a harmful mutation was identified in one or more of the genes    Negative--no mutation was identified in any of the genes on this panel    Variant of unknown significance--a variation in one of the genes was identified, but it is unclear how this impacts cancer risk in the family    Advantages and Disadvantages   There are advantages and  disadvantages to genetic testing.    Advantages    May clarify your cancer risk    Can help you make medical decisions    May explain the cancers in your family    May give useful information to your family members (if you share your results)    Disadvantages    Possible negative emotional impact of learning about inherited cancer risk    Uncertainty in interpreting a negative test result in some situations    Possible genetic discrimination concerns (see below)    Genetic Information Nondiscrimination Act (KELLEE)  KELLEE is a federal law that protects individuals from health insurance or employment discrimination based on a genetic test result alone.  Although rare, there are currently no legal discrimination protections in terms of life insurance, long term care, or disability insurances.  Visit the [employer] Research [place] website to learn more.    Reducing Cancer Risk  All of the genes described above have nationally recognized cancer screening guidelines that would be recommended for individuals who test positive.  In addition to increased cancer screening, surgeries may be offered or recommended to reduce cancer risk.  Recommendations are based upon an individual s genetic test result as well as their personal and family history of cancer.    Questions to Think About Regarding Genetic Testing:    What effect will the test result have on me and my relationship with my family members if I have an inherited gene mutation?  If I don t have a gene mutation?    Should I share my test results, and how will my family react to this news, which may also affect them?    Are my children ready to learn new information that may one day affect their own health?    Hereditary Cancer Resources    FORCE: Facing Our Risk of Cancer Empowered facingourrisk.org   Bright Pink bebrightpink.org   Li-Fraumeni Syndrome Association lfsassociation.org   PTEN World PTENworld.com   No stomach for cancer, Inc.  nostomachforcancer.org   Stomach cancer relief network Scrnet.org   Collaborative Group of the Americas on Inherited Colorectal Cancer (CGA) cgaicc.com    Cancer Care cancercare.org   American Cancer Society (ACS) cancer.org   National Cancer [place] (NCI) cancer.gov     Please call us if you have any questions or concerns.   Cancer Risk Management Program [phone] ([phone])  ? Charles Campos, MS, [facility] [phone]  ? Dayana Cardenas, MS, [facility]  [phone]  ? Olga Padilla, MS, [facility]  [phone]  ? Lizbeth Femi, MS, [facility] [phone]  ? Jewels Karla, MS, [facility] [phone]  ? Mine Porfirio, MS, [facility]  [phone]  ? Radha Boyd, MS  [phone]    References  1. Salbador ARGUETA, Omari PDP, Andres S, Enrike OROPEZA, Rod JE, Erika JL, Yumiko N, Ramirez H, Jocelyne O, Emmanuel A, Michaela B, Radiadama P, Mancabrera S, Ruby DM, Thomson N, Elvin E, Martell H, Mike E, Jeanine J, Groncheikh J, Joe B, Jocelyn H, Thorlacius S, Eerola H, Nevcarynna H, Ina K, Adan OP. Average risks of breast and ovarian cancer associated with BRCA1 or BRCA2 mutations detected in case series unselected for family history: a combined analysis of 222 studies. Am J Hum Sylwia. 2003;72:1117-30.  2. Janel N, Savannah M, Pool G.  BRCA1 and BRCA2 Hereditary Breast and Ovarian Cancer. Gene Reviews online. 2013.  3. Angelo YC, Brandon S, Beatriz G, Rayo S. Breast cancer risk among male BRCA1 and BRCA2 mutation carriers. J Natl Cancer Inst. 2007;99:1811-4.  4. Wyatt FIERRO, Flaco I, Geraldo J, Rosana E, Ty ER, [place] F. Risk of breast cancer in male BRCA2 carriers. J Med Sylwia. 2010;47:710-1.  5. National Comprehensive Cancer Network. Clinical practice guidelines in oncology, colorectal cancer screening. Available online (registration required). 2015.  6. Gavin PASTRANA, Florencio J, Susanne J, Bessie LA, Aldair MARCOS, Eng C. Lifetime cancer risks in individuals with germline PTEN mutations. Clin Cancer Res. 2012;18:400-7.  7. Pilarski R. Cowden  Syndrome: A Critical Review of the Clinical Literature. J Sylwia . 2009:18:13-27.  8. Lisa A, Reinier D, Elda S, Bette P, Brooke T, Angelo M, Bran B, Rubén H, Konstantin R, Irlanda K, Savage L, Wyatt DG, Ruby D, Emir DF, Luis Enrique MR, The Breast Cancer Susceptibility Collaboration (UK) & Sanjeev HERNDON. ISHA mutations that cause ataxia-telangiectasia are breast cancer susceptibility alleles. Nature Genetics. 2006;38:873-875  9. Randal N , Kellen Y, Sonia J, Rufino L, Marco GM , Jade ML, Gallinger S, Ledesma AG, Syngal S, Glen ML, Marcela J , Prince R, Hema SZ, Izzy JR, Raghu VE, Luis Felipe M, Vomirian B, Samantha N, Gio RH, Lisa KW, and Anum AP. ISHA mutations in patients with hereditary pancreatic cancer. Cancer Discover. 2012;2:41-46  10. Salbador FIGUEROA, et al. Breast-Cancer Risk in Families with Mutations in PALB2. NEJM. 2014; 371(6):497-506.  11. CHEK2 Breast Cancer Case-Control Consortium. CHEK2*1100delC and susceptibility to breast cancer: A collaborative analysis involving 10,860 breast cancer cases and 9,065 controls from 10 studies. Am J Hum [place]ia, 74 (2004), pp. 9003-9862  12. Rylee T, Дмитрий S, Lois K, et al. Spectrum of Mutations in BRCA1, BRCA2, CHEK2, and TP53 in Families at High Risk of Breast Cancer. MICHELL. 2006;295(12):5895-7772.   13. Juve C, Jose Elias D, Carlos A, et al. Risk of breast cancer in women with a CHEK2 mutation with and without a family history of breast cancer. J Clin Oncol. 2011;29:5101-8515.  14. John H, Desiree E, Elenita SJ, et al. Contribution of germline mutations in the RAD51B, RAD51C, and RAD51D genes to ovarian cancer in the population. J Clin Oncol. 2015;33(26):8665-0362. Doi:10.1200/JCO.2015.61.2408.  15. Yves T, [place] KILLIAN, Simon P, et al. Mutations in BRIP1 confer high risk of ovarian cancer. Krysta Sylwia. 2011;43(11):0143-5794. doi:10.1038/ng.955.

## 2021-12-11 ENCOUNTER — TELEPHONE (OUTPATIENT)
Dept: ONCOLOGY | Facility: CLINIC | Age: 51
End: 2021-12-11
Payer: COMMERCIAL

## 2021-12-11 NOTE — TELEPHONE ENCOUNTER
12/11/21 - [facility] to call [phone] opt5, opt2 to schedule virtual return with Olga Padilla ANYTIME for genetic testing results. -Ramses

## 2021-12-16 NOTE — PROGRESS NOTES
"12/17/2021    Referring Provider: Alexis Chatman MD    Presenting Information:  Bette Celeste returned to the Cancer Risk Management Program (virtual visit) to discuss her genetic testing results.  Testing was sent through [employer] due to her family history of breast, pancreatic, and eye cancers.    Genetic Testing Result: NEGATIVE  No pathogenic mutations, variants of unknown significance, or gross deletions or duplications were detected.     Genes Analyzed (40 total): APC, ISHA, AXIN2, BAP1, BARD1, BMPR1A, BRCA1, BRCA2, BRIP1, CDH1, CDK4, CDKN2A, CHEK2, DICER1, MLH1, MSH2, MSH3, MSH6, MUTYH, NBN, NF1, NTHL1, PALB2, PMS2, POT1, PTEN, RAD51C, RAD51D, RB1, RECQL, SMAD4, SMARCA4, STK11 and TP53 (sequencing and deletion/duplication); HOXB13, MITF, POLD1 and POLE (sequencing only); EPCAM and GREM1 (deletion/duplication only).     A copy of the test report can be found in the Laboratory tab, dated 11/30/2021, and named \"LABORATORY MISCELLANEOUS ORDER\". The report is scanned in as a linked document.    Interpretation:  We discussed several different interpretations of this negative test result.    1. One explanation may be that there is a different gene or combination of genes and environment that are associated with the cancers in this family.  2. Another explanation may be that her mother, close maternal relatives, may have had a mutation in a cancer susceptibility gene which Bette did not inherit.  3. There is also a small possibility that there is a mutation in one of these genes, and the testing laboratory could not find it with their current testing methods.       Screening:  Based on this negative test result, it is important for Bette and her relatives to refer back to the family history for appropriate cancer screening.      Bette and her close female relatives remain at increased risk for breast cancer given their family history. Breast cancer screening is generally recommended to begin approximately 10 years " younger than the earliest age of breast cancer diagnosis in the family, or at age 40, whichever comes first.Breast screening options should be discussed with an individual's primary care provider and a genetic counselor, to determine at what age to begin screening, what screening is appropriate, and if additional screening (such as breast MRI) is necessary based on personal/family history factors.      Bette remains at increased risk for breast cancer based on her family history.  Based on her personal and family history, Bette has a 16.2-19.2% lifetime risk of developing breast cancer based on the Nathaniel and IBISv8 models. Bette does not meet current National Comprehensive Cancer Network (NCCN) guidelines for high risk breast screening, which is offered to women with a 20% lifetime risk or higher. However, it is still important for Bette to continue with routine breast screening under the care of her physicians. Breast cancer screening is generally recommended to begin approximately 10 years younger than the earliest age of breast cancer diagnosis in the family, or at age 40, whichever comes first. Bette is encouraged to discuss breast screening with her physicians.     Bette may remain at increased risk for pancreatic cancer due to her mother's history, however this screening is not routinely done.  For families with an identified genetic risk factor, and/or multiple close relatives diagnosed with pancreatic cancer, screening may be considered.  This screening typically involves endoscopic ultrasonography (EUS) and/or MRI/magnetic resonance cholangiopancreatography (Arely et al, Gut 2013. 62: 339-347; Jody S, et al., Am J Gastroenterol 2015. 110:223-262). Given the significant limitations of this screening, Bette could consider meeting with her primary care provider to discuss this screening in more detail.     Other population cancer screening options, such as those recommended by the American Cancer Society and  the National Comprehensive Cancer Network (NCCN), are also appropriate for Bette. These screening recommendations may change if there are changes to Bette's personal and/or family history of cancer. Final screening recommendations should be made by each individual's primary care provider.    Inheritance:  We reviewed the autosomal dominant inheritance of mutations in these 40 genes. Bette cannot/did not pass on an identifiable mutation in these genes to her children based on this test result. Mutations in these genes do not skip generations.      Additional Testing Considerations:  Although Bette's genetic testing result was negative, other relatives may still carry a gene mutation associated with pancreatic, breast, and related cancer. Genetic counseling is recommended for her siblings, and close maternal relatives, to discuss genetic testing options.  If any of these relatives do pursue genetic testing, Bette is encouraged to contact me so that we may review the impact of their test results on her.    Summary:  We do not have an explanation for Bette's family history of breast, pancreatic and eye cancer. While no genetic changes were identified, Bette may still be at risk for certain cancers due to family history, environmental factors, or other genetic causes not identified by this test.  Because of that, it is important that she continue with cancer screening based on her personal and family history as discussed above.    Genetic testing is rapidly advancing, and new cancer susceptibility genes will most likely be identified in the future. Therefore, I encouraged Bette to contact me annually or if there are changes in her personal or family history. This may change how we assess her cancer risk, screening, and the testing we would offer.    Plan:  1.  I provided Bette with the results of her genetic testing.  2. She plans to follow-up with her managing physicians.  3. She should contact me regularly, or sooner  if her family history changes.    If Bette has any further questions, I encouraged her to contact me at [phone].    Time spent face to face: 10 minutes    Olga Padilla MS, [facility] [place]  Licensed, Certified Genetic Counselor  [facility]  Phone: [phone]

## 2021-12-17 ENCOUNTER — VIRTUAL VISIT (OUTPATIENT)
Dept: ONCOLOGY | Facility: CLINIC | Age: 51
End: 2021-12-17
Attending: GENETIC COUNSELOR, MS
Payer: COMMERCIAL

## 2021-12-17 DIAGNOSIS — Z80.3 FAMILY HISTORY OF MALIGNANT NEOPLASM OF BREAST: Primary | ICD-10-CM

## 2021-12-17 DIAGNOSIS — Z80.0 FAMILY HISTORY OF PANCREATIC CANCER: ICD-10-CM

## 2021-12-17 PROCEDURE — 999N000069 HC STATISTIC GENETIC COUNSELING, < 16 MIN: Mod: GT,95 | Performed by: GENETIC COUNSELOR, MS

## 2021-12-17 NOTE — LETTER
Cancer Risk Management  Program Locations    [facility]  [facility]  [facility]  Mailing Address  Cancer Risk Management Program  [address]  [place] [address]    New patient appointments  [phone]  December 17, 2021    Bette Celeste  [address] [place] [address]      Dear Bette,    It was a pleasure meeting with you on 12/17/2021. Here is a copy of the progress note from your recent genetic counseling visit to the Cancer Risk Management Program. If you have any additional questions, please feel free to call.    Referring Provider: Alexis Chatman MD    Presenting Information:  Bette Celeste returned to the Cancer Risk Management Program (virtual visit) to discuss her genetic testing results.  Testing was sent through [employer] due to her family history of breast, pancreatic, and eye cancers.    Genetic Testing Result: NEGATIVE  No pathogenic mutations, variants of unknown significance, or gross deletions or duplications were detected.     Genes Analyzed (40 total): APC, ISHA, AXIN2, BAP1, BARD1, BMPR1A, BRCA1, BRCA2, BRIP1, CDH1, CDK4, CDKN2A, CHEK2, DICER1, MLH1, MSH2, MSH3, MSH6, MUTYH, NBN, NF1, NTHL1, PALB2, PMS2, POT1, PTEN, RAD51C, RAD51D, RB1, RECQL, SMAD4, SMARCA4, STK11 and TP53 (sequencing and deletion/duplication); HOXB13, MITF, POLD1 and POLE (sequencing only); EPCAM and GREM1 (deletion/duplication only).     Interpretation:  We discussed several different interpretations of this negative test result.    1. One explanation may be that there is a different gene or combination of genes and environment that are associated with the cancers in this family.  2. Another explanation may be that her mother, close maternal relatives, may have had a mutation in a cancer susceptibility gene which  Bette did not inherit.  3. There is also a small possibility that there is a mutation in one of these genes, and the testing laboratory could not find it with their current testing methods.       Screening:  Based on this negative test result, it is important for Bette and her relatives to refer back to the family history for appropriate cancer screening.      Bette and her close female relatives remain at increased risk for breast cancer given their family history. Breast cancer screening is generally recommended to begin approximately 10 years younger than the earliest age of breast cancer diagnosis in the family, or at age 40, whichever comes first.Breast screening options should be discussed with an individual's primary care provider and a genetic counselor, to determine at what age to begin screening, what screening is appropriate, and if additional screening (such as breast MRI) is necessary based on personal/family history factors.      Bette remains at increased risk for breast cancer based on her family history.  Based on her personal and family history, Bette has a 16.2-19.2% lifetime risk of developing breast cancer based on the Nathaniel and IBISv8 models. Bette does not meet current National Comprehensive Cancer Network (NCCN) guidelines for high risk breast screening, which is offered to women with a 20% lifetime risk or higher. However, it is still important for Bette to continue with routine breast screening under the care of her physicians. Breast cancer screening is generally recommended to begin approximately 10 years younger than the earliest age of breast cancer diagnosis in the family, or at age 40, whichever comes first. Betet is encouraged to discuss breast screening with her physicians.     Bette may remain at increased risk for pancreatic cancer due to her mother's history, however this screening is not routinely done.  For families with an identified genetic risk factor, and/or multiple close  relatives diagnosed with pancreatic cancer, screening may be considered.  This screening typically involves endoscopic ultrasonography (EUS) and/or MRI/magnetic resonance cholangiopancreatography (Arely et al, Gut 2013. 62: 339-347; Jody S, et al., Am J Gastroenterol 2015. 110:223-262). Given the significant limitations of this screening, Bette could consider meeting with her primary care provider to discuss this screening in more detail.     Other population cancer screening options, such as those recommended by the American Cancer Society and the National Comprehensive Cancer Network (NCCN), are also appropriate for Bette. These screening recommendations may change if there are changes to Bette's personal and/or family history of cancer. Final screening recommendations should be made by each individual's primary care provider.    Inheritance:  We reviewed the autosomal dominant inheritance of mutations in these 40 genes. Bette cannot/did not pass on an identifiable mutation in these genes to her children based on this test result. Mutations in these genes do not skip generations.      Additional Testing Considerations:  Although Bette's genetic testing result was negative, other relatives may still carry a gene mutation associated with pancreatic, breast, and related cancer. Genetic counseling is recommended for her siblings, and close maternal relatives, to discuss genetic testing options.  If any of these relatives do pursue genetic testing, Bette is encouraged to contact me so that we may review the impact of their test results on her.    Summary:  We do not have an explanation for Bette's family history of breast, pancreatic and eye cancer. While no genetic changes were identified, Bette may still be at risk for certain cancers due to family history, environmental factors, or other genetic causes not identified by this test.  Because of that, it is important that she continue with cancer screening based on  her personal and family history as discussed above.    Genetic testing is rapidly advancing, and new cancer susceptibility genes will most likely be identified in the future. Therefore, I encouraged Bette to contact me annually or if there are changes in her personal or family history. This may change how we assess her cancer risk, screening, and the testing we would offer.    Plan:  1.  I provided Bette with the results of her genetic testing.  2. She plans to follow-up with her managing physicians.  3. She should contact me regularly, or sooner if her family history changes.    If Bette has any further questions, I encouraged her to contact me at [phone].    Olga Padilla MS, [facility] [place]  Licensed, Certified Genetic Counselor  [facility]

## 2021-12-17 NOTE — LETTER
"    12/17/2021         RE: Bette Celeste  [address] [place] [address]        Dear Colleague,    Thank you for referring your patient, Bette Celeste, to the [facility]. Please see a copy of my visit note below.    12/17/2021    Referring Provider: Alexis Chatman MD    Presenting Information:  Btete Celeste returned to the Cancer Risk Management Program (virtual visit) to discuss her genetic testing results.  Testing was sent through [employer] due to her family history of breast, pancreatic, and eye cancers.    Genetic Testing Result: NEGATIVE  No pathogenic mutations, variants of unknown significance, or gross deletions or duplications were detected.     Genes Analyzed (40 total): APC, ISHA, AXIN2, BAP1, BARD1, BMPR1A, BRCA1, BRCA2, BRIP1, CDH1, CDK4, CDKN2A, CHEK2, DICER1, MLH1, MSH2, MSH3, MSH6, MUTYH, NBN, NF1, NTHL1, PALB2, PMS2, POT1, PTEN, RAD51C, RAD51D, RB1, RECQL, SMAD4, SMARCA4, STK11 and TP53 (sequencing and deletion/duplication); HOXB13, MITF, POLD1 and POLE (sequencing only); EPCAM and GREM1 (deletion/duplication only).     A copy of the test report can be found in the Laboratory tab, dated 11/30/2021, and named \"LABORATORY MISCELLANEOUS ORDER\". The report is scanned in as a linked document.    Interpretation:  We discussed several different interpretations of this negative test result.    1. One explanation may be that there is a different gene or combination of genes and environment that are associated with the cancers in this family.  2. Another explanation may be that her mother, close maternal relatives, may have had a mutation in a cancer susceptibility gene which Bette did not inherit.  3. There is also a small possibility that there is a mutation in one of these genes, and the testing laboratory could not find it with their current testing methods.       Screening:  Based on this negative test result, it is important for Bette and her relatives to " refer back to the family history for appropriate cancer screening.      Bette and her close female relatives remain at increased risk for breast cancer given their family history. Breast cancer screening is generally recommended to begin approximately 10 years younger than the earliest age of breast cancer diagnosis in the family, or at age 40, whichever comes first.Breast screening options should be discussed with an individual's primary care provider and a genetic counselor, to determine at what age to begin screening, what screening is appropriate, and if additional screening (such as breast MRI) is necessary based on personal/family history factors.      Bette remains at increased risk for breast cancer based on her family history.  Based on her personal and family history, Bette has a 16.2-19.2% lifetime risk of developing breast cancer based on the Nathaneil and IBISv8 models. Bette does not meet current National Comprehensive Cancer Network (NCCN) guidelines for high risk breast screening, which is offered to women with a 20% lifetime risk or higher. However, it is still important for Bette to continue with routine breast screening under the care of her physicians. Breast cancer screening is generally recommended to begin approximately 10 years younger than the earliest age of breast cancer diagnosis in the family, or at age 40, whichever comes first. Bette is encouraged to discuss breast screening with her physicians.     Bette may remain at increased risk for pancreatic cancer due to her mother's history, however this screening is not routinely done.  For families with an identified genetic risk factor, and/or multiple close relatives diagnosed with pancreatic cancer, screening may be considered.  This screening typically involves endoscopic ultrasonography (EUS) and/or MRI/magnetic resonance cholangiopancreatography (Arely et al, Gut 2013. 62: 339-347; Jody S, et al., Am J Gastroenterol 2015. 110:223-262).  Given the significant limitations of this screening, Bette could consider meeting with her primary care provider to discuss this screening in more detail.     Other population cancer screening options, such as those recommended by the American Cancer Society and the National Comprehensive Cancer Network (NCCN), are also appropriate for Bette. These screening recommendations may change if there are changes to Bette's personal and/or family history of cancer. Final screening recommendations should be made by each individual's primary care provider.    Inheritance:  We reviewed the autosomal dominant inheritance of mutations in these 40 genes. Bette cannot/did not pass on an identifiable mutation in these genes to her children based on this test result. Mutations in these genes do not skip generations.      Additional Testing Considerations:  Although Bette's genetic testing result was negative, other relatives may still carry a gene mutation associated with pancreatic, breast, and related cancer. Genetic counseling is recommended for her siblings, and close maternal relatives, to discuss genetic testing options.  If any of these relatives do pursue genetic testing, Bette is encouraged to contact me so that we may review the impact of their test results on her.    Summary:  We do not have an explanation for Bette's family history of breast, pancreatic and eye cancer. While no genetic changes were identified, Bette may still be at risk for certain cancers due to family history, environmental factors, or other genetic causes not identified by this test.  Because of that, it is important that she continue with cancer screening based on her personal and family history as discussed above.    Genetic testing is rapidly advancing, and new cancer susceptibility genes will most likely be identified in the future. Therefore, I encouraged Bette to contact me annually or if there are changes in her personal or family history. This  may change how we assess her cancer risk, screening, and the testing we would offer.    Plan:  1.  I provided Bette with the results of her genetic testing.  2. She plans to follow-up with her managing physicians.  3. She should contact me regularly, or sooner if her family history changes.    If Bette has any further questions, I encouraged her to contact me at [phone].    Time spent face to face: 10 minutes    Olga Padilla MS, [facility] [place]  Licensed, Certified Genetic Counselor  [facility]  Phone: [phone]                Again, thank you for allowing me to participate in the care of your patient.        Sincerely,        Olga Padilla GC

## 2021-12-17 NOTE — Clinical Note
"Please print and mail a copy of this letter to the patient with her genetic test report: A copy of the test report can be found in the Laboratory tab, dated 11/30/2021, and named \"LABORATORY MISCELLANEOUS ORDER\". The report is scanned in as a linked document."

## 2022-01-28 ENCOUNTER — OFFICE VISIT (OUTPATIENT)
Dept: FAMILY MEDICINE | Facility: CLINIC | Age: 52
End: 2022-01-28
Payer: COMMERCIAL

## 2022-01-28 VITALS — DIASTOLIC BLOOD PRESSURE: 58 MMHG | SYSTOLIC BLOOD PRESSURE: 98 MMHG

## 2022-01-28 DIAGNOSIS — L30.9 DERMATITIS: Primary | ICD-10-CM

## 2022-01-28 PROCEDURE — 99214 OFFICE O/P EST MOD 30 MIN: CPT | Performed by: DERMATOLOGY

## 2022-01-28 RX ORDER — TRIAMCINOLONE ACETONIDE 1 MG/G
CREAM TOPICAL 2 TIMES DAILY
Qty: 80 G | Refills: 3 | Status: SHIPPED | OUTPATIENT
Start: 2022-01-28 | End: 2022-03-30

## 2022-01-28 NOTE — PROGRESS NOTES
[facility] Dermatology Clinic Note - EP    Encounter Date: Jan 28, 2022    Dermatology Problem List:  #. Dermatitis   # Notalgia paresthetica      ____________________________________________    Assessment & Plan:   # Dermatitis, neck  Improved, but not resolved with desonide. Therefore, will trial a stronger topical steroid.   - Stop desonide 0.05% cream   - Start TMC 0.1% cream twice daily until resolved. Continue for a few days after resolved and then taper use. Restart regimen PRN for flares.   - Dry skin care techniques provided.      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   - Continue Lidex 0.05% solution BID for a few weeks at a time only     Procedures Performed:   None    Follow-up: Let us know if not improved in 1 month    Scribe Disclosure:  I, Marlene Glez, am serving as a scribe to document services personally performed by Jian Che MD based on data collection and the provider's statements to me.     Provider Disclosure:  The documentation recorded by the scribe accurately reflects the services I personally performed and the decisions made by me.    Jian Che MD  Dermatology/Dermatopathology Staff Physician  , Department of Dermatology    ____________________________________________    CC: Follow Up (dermatitis)      HPI:  Ms. Bette Celeste is a(n) extremely pleasant 51 year old female who presents today as a return patient for follow-up. The patient was last seen by myself on 10/29/21 at which point she was started on Lidex solution for notalgia paresthetica on the scalp. For dermatitis, she was started on desonide cream.    Today, the patient reports improvement, but not resolution of her itching. She has been applying desonide cream to her back and upper chest. She tries hard to avoid itching. She has been using the lidex solution strictly on her scalp and reports significant  improvement with this. Patient is otherwise feeling well, without additional skin concerns.     Labs Reviewed:  N/A    Physical Exam:  Vitals: BP 98/58   SKIN: Focused examination of neck and chest  was performed.  - Eczematous dermatits of anterior neck and chest, improved from prior.   - No other lesions of concern on areas examined.     Medications:  Current Outpatient Medications   Medication     Calcium Citrate-Vitamin D (CALCIUM + D PO)     desonide (DESOWEN) 0.05 % external cream     diazepam (VALIUM) 5 MG tablet     fluocinonide (LIDEX) 0.05 % external solution     FLUoxetine 20 MG tablet     meclizine (ANTIVERT) 25 MG tablet     No current facility-administered medications for this visit.      Past Medical History:   Patient Active Problem List   Diagnosis     Anxiety     Past Medical History:   Diagnosis Date     Arnold-Chiari malformation, type II (H)      Vertigo        CC No referring provider defined for this encounter. on close of this encounter.    This note has been created using voice recognition software; while it has been reviewed, some errors may persist.

## 2022-01-28 NOTE — LETTER
1/28/2022         RE: Bette Celeste  [address]  [place] [address]        Dear Colleague,    Thank you for referring your patient, Bette Celeste, to the [facility]. Please see a copy of my visit note below.    [facility] Dermatology Clinic Note - EP    Encounter Date: Jan 28, 2022    Dermatology Problem List:  #. Dermatitis   # Notalgia paresthetica      ____________________________________________    Assessment & Plan:   # Dermatitis, neck  Improved, but not resolved with desonide. Therefore, will trial a stronger topical steroid.   - Stop desonide 0.05% cream   - Start TMC 0.1% cream twice daily until resolved. Continue for a few days after resolved and then taper use. Restart regimen PRN for flares.   - Dry skin care techniques provided.      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   - Continue Lidex 0.05% solution BID for a few weeks at a time only     Procedures Performed:   None    Follow-up: Let us know if not improved in 1 month    Scribe Disclosure:  I, Marlene Glez, am serving as a scribe to document services personally performed by Jian Che MD based on data collection and the provider's statements to me.     Provider Disclosure:  The documentation recorded by the scribe accurately reflects the services I personally performed and the decisions made by me.    Jian Che MD  Dermatology/Dermatopathology Staff Physician  , Department of Dermatology    ____________________________________________    CC: Follow Up (dermatitis)      HPI:  Ms. Bette Celeste is a(n) extremely pleasant 51 year old female who presents today as a return patient for follow-up. The patient was last seen by myself on 10/29/21 at which point she was started on Lidex solution for notalgia paresthetica on the scalp. For dermatitis, she was started on desonide  cream.    Today, the patient reports improvement, but not resolution of her itching. She has been applying desonide cream to her back and upper chest. She tries hard to avoid itching. She has been using the lidex solution strictly on her scalp and reports significant improvement with this. Patient is otherwise feeling well, without additional skin concerns.     Labs Reviewed:  N/A    Physical Exam:  Vitals: BP 98/58   SKIN: Focused examination of neck and chest  was performed.  - Eczematous dermatits of anterior neck and chest, improved from prior.   - No other lesions of concern on areas examined.     Medications:  Current Outpatient Medications   Medication     Calcium Citrate-Vitamin D (CALCIUM + D PO)     desonide (DESOWEN) 0.05 % external cream     diazepam (VALIUM) 5 MG tablet     fluocinonide (LIDEX) 0.05 % external solution     FLUoxetine 20 MG tablet     meclizine (ANTIVERT) 25 MG tablet     No current facility-administered medications for this visit.      Past Medical History:   Patient Active Problem List   Diagnosis     Anxiety     Past Medical History:   Diagnosis Date     Arnold-Chiari malformation, type II (H)      Vertigo        CC No referring provider defined for this encounter. on close of this encounter.    This note has been created using voice recognition software; while it has been reviewed, some errors may persist.         Again, thank you for allowing me to participate in the care of your patient.        Sincerely,        Jian Che MD

## 2022-03-29 NOTE — PROGRESS NOTES
SUBJECTIVE:   Bette Celeste is a 51 year old female who presents to clinic today for hospital discharge follow up.    Hospital/Nursing Home/IP Rehab Facility: [facility]  Date of Admission: 3/9/22  Date of Discharge: 3/21/22    Discharged to [facility]  Date of Admission: 3/21/22  Date of Discharge: 3/26/22    Principal Diagnosis   Intracranial hemorrhage    Hospital Problem List   Principal Problem:  Intracranial hemorrhage (HC)  Active Problems:  Depression  Encephalopathy  S/P craniotomy    Summary of hospitalization:  Hospital discharge summary reviewed.     Post Discharge Medication Reconciliation: discharge medications reconciled, continue medications without change.  Problems taking medications regularly:  None  Medication changes since discharge:   - medications added at [facility]    - hydrocortisone 1%, 4 times daily PRN for itching    - miralax 17g daily PRN for constipation  - discontinued at [facility]    - oxycodone 5mg    - senokot S    Diagnostic Tests/Treatments reviewed.  Follow up needed: Plan to meet with neurology in April, will consider repeat MRI at that time.  Other Healthcare Providers Involved in Patient s Care: ongoing PT/OT/SLP  Update since discharge: improved.    Plan of care communicated with patient and family    Review of Systems:   Constitutional - no fevers, chills, night sweats, unintentional weight loss/gain   Eyes - no vision concerns   Ears/Nose/Throat - no hearing concerns, no dysphagia/odynophagia, congestion   Cardiovascular - no chest pain, palpitations   Pulmonary - no shortness of breath, wheezing, coughing   GI - no abdominal pain, constipation, diarrhea, nausea, vomiting    - no dysuria, polyuria, hematuria   Musculoskeletal - no joint or muscle pain  Integument - no rash   Neuro - no weakness, numbness, paresthesia   Heme - no easy bruising/bleeding   Endocrine - no polyuria, weight loss/gain, dry skin,  excessive sweating, hair loss   Psychiatric - no feelings of depressed mood or anhedonia in past 2 weeks   Allergic/Immunologic - no history of anaphylaxis, no history of recurrent infections    Patient Active Problem List   Diagnosis     Anxiety     Past Surgical History:   Procedure Laterality Date     BRAIN SURGERY  1990    Arnold Chiari correction in 1990 and later hernia repair in 2008     CRANIOTOMY  03/10/2022    Right frontal temporal     History reviewed. No pertinent family history.  Social History     Tobacco Use     Smoking status: Never Smoker     Smokeless tobacco: Never Used   Substance Use Topics     Alcohol use: Yes     Drug use: Never     Social History     Social History Narrative     Not on file       Current Outpatient Medications   Medication     Calcium Citrate-Vitamin D (CALCIUM + D PO)     desonide (DESOWEN) 0.05 % external cream     diazepam (VALIUM) 5 MG tablet     fluocinonide (LIDEX) 0.05 % external solution     FLUoxetine 20 MG tablet     meclizine (ANTIVERT) 25 MG tablet     triamcinolone (KENALOG) 0.1 % external cream     No current facility-administered medications for this visit.     I have reviewed the patient's past medical, surgical, family, and social history.     OBJECTIVE:   /79 (BP Location: Right arm, Patient Position: Sitting, Cuff Size: Adult Small)   Pulse 84   Temp 98.3  F (36.8  C) (Skin)   Resp 12   Wt 46 kg (101 lb 8 oz)   SpO2 100%   BMI 16.89 kg/m      Constitutional: well-appearing, appears stated age  Eyes: conjunctivae without erythema, sclera anicteric. Pupils equal, round, and reactive to light.   ENT: oropharynx clear, TM grey bilateral  Cardiac: regular rate and rhythm, normal S1/S2, no murmur/rubs/gallops  Respiratory: lungs clear to auscultation bilaterally, normal work of breathing, no wheezes/crackles  GI: abdomen soft, non-tender, non-distended  Extremities: warm and well perfused, radial pulses 2+ and equal, cap refill brisk.  Lymph: no  cervical or supraclavicular lymphadenopathy  Skin: no rashes, lesions, or wounds  Psych: affect is full and appropriate, speech is fluent and non-pressured    ASSESSMENT AND PLAN:     Bette was seen today for hospital f/u.    Diagnoses and all orders for this visit:    Hospital discharge follow-up    Arnold-Chiari malformation (H)    Intracranial hemorrhage (H)      Patient appears stable/improved. Meds reconciled, no change in meds today. Will continue to monitor and offer assistance with recovery as needed.     34 minutes spent on the date of the encounter doing chart review, history and exam, documentation and further activities as noted.      Alexis Chatman MD   03/29/22, 4:57 PM

## 2022-03-30 ENCOUNTER — OFFICE VISIT (OUTPATIENT)
Dept: FAMILY MEDICINE | Facility: CLINIC | Age: 52
End: 2022-03-30
Payer: COMMERCIAL

## 2022-03-30 VITALS
WEIGHT: 101.5 LBS | HEART RATE: 84 BPM | SYSTOLIC BLOOD PRESSURE: 108 MMHG | BODY MASS INDEX: 16.89 KG/M2 | DIASTOLIC BLOOD PRESSURE: 79 MMHG | OXYGEN SATURATION: 100 % | TEMPERATURE: 98.3 F | RESPIRATION RATE: 12 BRPM

## 2022-03-30 DIAGNOSIS — I62.9 INTRACRANIAL HEMORRHAGE (H): ICD-10-CM

## 2022-03-30 DIAGNOSIS — Z09 HOSPITAL DISCHARGE FOLLOW-UP: Primary | ICD-10-CM

## 2022-03-30 DIAGNOSIS — Q07.00 ARNOLD-CHIARI MALFORMATION (H): ICD-10-CM

## 2022-03-30 PROBLEM — H81.10 BENIGN PAROXYSMAL POSITIONAL VERTIGO: Status: ACTIVE | Noted: 2019-01-04

## 2022-03-30 PROBLEM — F32.A DEPRESSION: Status: ACTIVE | Noted: 2022-03-17

## 2022-03-30 PROBLEM — R53.83 FATIGUE: Status: ACTIVE | Noted: 2022-03-30

## 2022-03-30 PROBLEM — I95.9 HYPOTENSION: Status: ACTIVE | Noted: 2022-03-21

## 2022-03-30 RX ORDER — AMITRIPTYLINE HYDROCHLORIDE 10 MG/1
10 TABLET ORAL DAILY
COMMUNITY
Start: 2022-03-25 | End: 2022-05-06

## 2022-03-30 RX ORDER — ACETAMINOPHEN 500 MG
1000 TABLET ORAL
COMMUNITY
Start: 2022-03-18 | End: 2022-03-30

## 2022-03-30 RX ORDER — POLYETHYLENE GLYCOL 3350 17 G/17G
17 POWDER, FOR SOLUTION ORAL
COMMUNITY
Start: 2022-03-25 | End: 2022-08-26

## 2022-03-30 RX ORDER — BISACODYL 5 MG/1
1 TABLET, COATED ORAL DAILY
COMMUNITY
End: 2022-08-26

## 2022-03-30 RX ORDER — ALPRAZOLAM 0.25 MG
0.25 TABLET ORAL 3 TIMES DAILY PRN
COMMUNITY
End: 2022-03-30

## 2022-03-30 RX ORDER — LEVETIRACETAM 500 MG/1
500 TABLET ORAL 2 TIMES DAILY
COMMUNITY
Start: 2022-03-25 | End: 2022-04-09

## 2022-03-30 RX ORDER — MECLIZINE HYDROCHLORIDE 25 MG/1
25 TABLET ORAL 3 TIMES DAILY PRN
COMMUNITY
End: 2023-09-18

## 2022-03-30 RX ORDER — HYDROXYZINE PAMOATE 25 MG/1
25-50 CAPSULE ORAL PRN
COMMUNITY
Start: 2022-03-25 | End: 2022-08-26

## 2022-03-30 RX ORDER — BENZOCAINE/MENTHOL 6 MG-10 MG
LOZENGE MUCOUS MEMBRANE
COMMUNITY
Start: 2022-03-25 | End: 2022-08-26

## 2022-03-30 NOTE — NURSING NOTE
51 year old  Chief Complaint   Patient presents with     Hospital F/U     3/9/22 - 3/26/22       Blood pressure 108/79, pulse 84, temperature 98.3  F (36.8  C), temperature source Skin, resp. rate 12, weight 46 kg (101 lb 8 oz), SpO2 100 %, not currently breastfeeding. Body mass index is 16.89 kg/m .  Patient Active Problem List   Diagnosis     Anxiety     Arnold-Chiari malformation (H)     Benign paroxysmal positional vertigo     Depression     Fatigue     Hypotension     Intracranial hemorrhage (H)       Wt Readings from Last 2 Encounters:   03/30/22 46 kg (101 lb 8 oz)   07/05/21 46.7 kg (103 lb 0.6 oz)     BP Readings from Last 3 Encounters:   03/30/22 108/79   01/28/22 98/58   10/29/21 98/58         Current Outpatient Medications   Medication     ALPRAZolam (XANAX) 0.25 MG tablet     amitriptyline (ELAVIL) 10 MG tablet     Calcium Citrate-Vitamin D (CALCIUM + D PO)     desonide (DESOWEN) 0.05 % external cream     diazepam (VALIUM) 5 MG tablet     FLUoxetine 20 MG tablet     hydrocortisone (CORTAID) 1 % external cream     hydrOXYzine (VISTARIL) 25 MG capsule     levETIRAcetam (KEPPRA) 500 MG tablet     meclizine (ANTIVERT) 25 MG tablet     Multiple Vitamins-Minerals (PX COMPLETE SENIOR MULTIVITS) TABS     polyethylene glycol (MIRALAX) 17 GM/Dose powder     triamcinolone (KENALOG) 0.1 % external cream     acetaminophen (TYLENOL) 500 MG tablet     fluocinonide (LIDEX) 0.05 % external solution     No current facility-administered medications for this visit.       Social History     Tobacco Use     Smoking status: Never Smoker     Smokeless tobacco: Never Used   Substance Use Topics     Alcohol use: Yes     Drug use: Never       Health Maintenance Due   Topic Date Due     ADVANCE CARE PLANNING  Never done     COLORECTAL CANCER SCREENING  Never done     HIV SCREENING  Never done     HEPATITIS C SCREENING  Never done     DTAP/TDAP/TD IMMUNIZATION (1 - Tdap) Never done     ZOSTER IMMUNIZATION (1 of 2) Never done      PAP  01/01/2022       No results found for: PAP      March 30, 2022 10:17 AM

## 2022-05-05 ENCOUNTER — MYC MEDICAL ADVICE (OUTPATIENT)
Dept: FAMILY MEDICINE | Facility: CLINIC | Age: 52
End: 2022-05-05
Payer: COMMERCIAL

## 2022-05-05 DIAGNOSIS — G47.00 INSOMNIA, UNSPECIFIED TYPE: ICD-10-CM

## 2022-05-05 DIAGNOSIS — F41.9 ANXIETY: ICD-10-CM

## 2022-05-05 DIAGNOSIS — R51.9 NONINTRACTABLE HEADACHE, UNSPECIFIED CHRONICITY PATTERN, UNSPECIFIED HEADACHE TYPE: Primary | ICD-10-CM

## 2022-05-06 RX ORDER — AMITRIPTYLINE HYDROCHLORIDE 10 MG/1
10 TABLET ORAL DAILY
Qty: 90 TABLET | Refills: 1 | Status: SHIPPED | OUTPATIENT
Start: 2022-05-06 | End: 2022-10-31

## 2022-05-06 NOTE — TELEPHONE ENCOUNTER
[facility].    Diagnoses and all orders for this visit:    Nonintractable headache, unspecified chronicity pattern, unspecified headache type  -     amitriptyline (ELAVIL) 10 MG tablet; Take 1 tablet (10 mg) by mouth daily    Insomnia, unspecified type  -     amitriptyline (ELAVIL) 10 MG tablet; Take 1 tablet (10 mg) by mouth daily    Anxiety  -     amitriptyline (ELAVIL) 10 MG tablet; Take 1 tablet (10 mg) by mouth daily    Alexis Chatman MD  11:48 AM, May 6, 2022

## 2022-07-11 ENCOUNTER — MYC MEDICAL ADVICE (OUTPATIENT)
Dept: FAMILY MEDICINE | Facility: CLINIC | Age: 52
End: 2022-07-11

## 2022-07-11 DIAGNOSIS — F41.1 GENERALIZED ANXIETY DISORDER: Primary | ICD-10-CM

## 2022-07-12 RX ORDER — FLUOXETINE 20 MG/1
20 TABLET, FILM COATED ORAL DAILY
Qty: 90 TABLET | Refills: 1 | Status: SHIPPED | OUTPATIENT
Start: 2022-07-12 | End: 2024-10-01

## 2022-07-12 NOTE — TELEPHONE ENCOUNTER
Med refilled as requested.     Bette was seen today for refill request.    Diagnoses and all orders for this visit:    Generalized anxiety disorder  -     FLUoxetine 20 MG tablet; Take 1 tablet (20 mg) by mouth daily      Alexis Chatman MD  5:13 PM, July 12, 2022

## 2022-07-12 NOTE — TELEPHONE ENCOUNTER
Last visit 3/30/22, no future visit  Routing refill request to provider for review/approval because:  Drug interaction warning      Discussed with Dr Chatman as he had not prescribed fluoxetine in the past.  Huyen Schuster RN  [facility]

## 2022-08-16 ENCOUNTER — TELEPHONE (OUTPATIENT)
Dept: ONCOLOGY | Facility: CLINIC | Age: 52
End: 2022-08-16

## 2022-08-16 NOTE — TELEPHONE ENCOUNTER
Bette contacted me regarding her previous genetic testing, and inquired about APOE testing.    We discussed that previous testing through the Cancer Risk Management Program included genes related to hereditary cancer risk: Genes Analyzed (40 total): APC, ISHA, AXIN2, BAP1, BARD1, BMPR1A, BRCA1, BRCA2, BRIP1, CDH1, CDK4, CDKN2A, CHEK2, DICER1, MLH1, MSH2, MSH3, MSH6, MUTYH, NBN, NF1, NTHL1, PALB2, PMS2, POT1, PTEN, RAD51C, RAD51D, RB1, RECQL, SMAD4, SMARCA4, STK11 and TP53 (sequencing and deletion/duplication); HOXB13, MITF, POLD1 and POLE (sequencing only); EPCAM and GREM1 (deletion/duplication only).     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  APOE genetic testing was suggested by her provider, and Bette is thinking about next steps and whether to consider testing. We discussed the availability of neurology genetic counseling at [facility], or through the [facility] should her doctor recommend this testing.  Bette elected to follow up with her neurologist at the [facility], and will contact me should additional genetic counseling/testing be indicated.  I am happy to help facilitate this discussing with our neurology team.  Additional questions/concerns were denied at this time.     Olga Padilla MS, [facility]  Licensed, Certified Genetic Counselor  [facility] Cancer Risk Management Program  Phone: [phone]

## 2022-08-18 ENCOUNTER — ANCILLARY PROCEDURE (OUTPATIENT)
Dept: MAMMOGRAPHY | Facility: CLINIC | Age: 52
End: 2022-08-18
Attending: FAMILY MEDICINE
Payer: COMMERCIAL

## 2022-08-18 DIAGNOSIS — Z12.31 VISIT FOR SCREENING MAMMOGRAM: ICD-10-CM

## 2022-08-18 PROCEDURE — 77067 SCR MAMMO BI INCL CAD: CPT | Mod: GC

## 2022-08-18 PROCEDURE — 77063 BREAST TOMOSYNTHESIS BI: CPT | Mod: GC

## 2022-08-24 ASSESSMENT — ENCOUNTER SYMPTOMS
HEMATURIA: 0
NERVOUS/ANXIOUS: 0
WEAKNESS: 0
JOINT SWELLING: 0
DIARRHEA: 0
CONSTIPATION: 0
ARTHRALGIAS: 0
SHORTNESS OF BREATH: 0
MYALGIAS: 0
HEADACHES: 0
COUGH: 0
ABDOMINAL PAIN: 0
DIZZINESS: 0
FREQUENCY: 0
PARESTHESIAS: 0
DYSURIA: 0
HEARTBURN: 0
SORE THROAT: 0
EYE PAIN: 0
CHILLS: 0
FEVER: 0
BREAST MASS: 0
PALPITATIONS: 0
HEMATOCHEZIA: 0
NAUSEA: 0

## 2022-08-25 ASSESSMENT — ENCOUNTER SYMPTOMS
FEVER: 0
ABDOMINAL PAIN: 0
PARESTHESIAS: 0
JOINT SWELLING: 0
BREAST MASS: 0
PALPITATIONS: 0
HEMATOCHEZIA: 0
CONSTIPATION: 0
HEMATURIA: 0
COUGH: 0
ARTHRALGIAS: 0
DIARRHEA: 0
EYE PAIN: 0
HEADACHES: 0
WEAKNESS: 0
NERVOUS/ANXIOUS: 0
SORE THROAT: 0
HEARTBURN: 0
CHILLS: 0
DIZZINESS: 0
MYALGIAS: 0
DYSURIA: 0
FREQUENCY: 0
SHORTNESS OF BREATH: 0
NAUSEA: 0

## 2022-08-26 ENCOUNTER — OFFICE VISIT (OUTPATIENT)
Dept: FAMILY MEDICINE | Facility: CLINIC | Age: 52
End: 2022-08-26
Payer: COMMERCIAL

## 2022-08-26 VITALS
BODY MASS INDEX: 16.33 KG/M2 | WEIGHT: 98 LBS | OXYGEN SATURATION: 100 % | SYSTOLIC BLOOD PRESSURE: 97 MMHG | HEART RATE: 82 BPM | HEIGHT: 65 IN | DIASTOLIC BLOOD PRESSURE: 71 MMHG | TEMPERATURE: 97 F

## 2022-08-26 DIAGNOSIS — Z13.220 LIPID SCREENING: Primary | ICD-10-CM

## 2022-08-26 DIAGNOSIS — Z00.00 ROUTINE HISTORY AND PHYSICAL EXAMINATION OF ADULT: ICD-10-CM

## 2022-08-26 PROBLEM — M25.579 ANKLE PAIN: Status: ACTIVE | Noted: 2022-07-06

## 2022-08-26 LAB
CHOLEST SERPL-MCNC: 188 MG/DL
HDLC SERPL-MCNC: 82 MG/DL
LDLC SERPL CALC-MCNC: 92 MG/DL
NONHDLC SERPL-MCNC: 106 MG/DL
TRIGL SERPL-MCNC: 71 MG/DL

## 2022-08-26 PROCEDURE — 80061 LIPID PANEL: CPT | Performed by: FAMILY MEDICINE

## 2022-08-26 RX ORDER — MULTIVITAMIN WITH IRON
1 TABLET ORAL DAILY
COMMUNITY

## 2022-10-01 ENCOUNTER — HEALTH MAINTENANCE LETTER (OUTPATIENT)
Age: 52
End: 2022-10-01

## 2022-10-28 DIAGNOSIS — F41.9 ANXIETY: ICD-10-CM

## 2022-10-28 DIAGNOSIS — G47.00 INSOMNIA, UNSPECIFIED TYPE: ICD-10-CM

## 2022-10-28 DIAGNOSIS — R51.9 NONINTRACTABLE HEADACHE, UNSPECIFIED CHRONICITY PATTERN, UNSPECIFIED HEADACHE TYPE: ICD-10-CM

## 2022-10-31 RX ORDER — AMITRIPTYLINE HYDROCHLORIDE 10 MG/1
10 TABLET ORAL DAILY
Qty: 90 TABLET | Refills: 1 | Status: SHIPPED | OUTPATIENT
Start: 2022-10-31 | End: 2023-09-18

## 2022-10-31 NOTE — TELEPHONE ENCOUNTER
Medication requested: amitriptyline (ELAVIL) 10 MG tablet  Last office visit: 8/26/2022  [facility] appointments: none  Medication last refilled: 5/6/2022; 90 + 1 refill  Last qualifying labs:     BP Readings from Last 3 Encounters:   08/26/22 97/71   03/30/22 108/79   01/28/22 98/58     Prescription approved per [facility] Refill Protocol.    ILEANA Chaudhry, RN  10/31/22, 11:26 AM

## 2023-09-08 ENCOUNTER — ANCILLARY PROCEDURE (OUTPATIENT)
Dept: MAMMOGRAPHY | Facility: CLINIC | Age: 53
End: 2023-09-08
Attending: FAMILY MEDICINE
Payer: COMMERCIAL

## 2023-09-08 DIAGNOSIS — Z12.31 VISIT FOR SCREENING MAMMOGRAM: ICD-10-CM

## 2023-09-08 PROCEDURE — 77063 BREAST TOMOSYNTHESIS BI: CPT | Performed by: STUDENT IN AN ORGANIZED HEALTH CARE EDUCATION/TRAINING PROGRAM

## 2023-09-08 PROCEDURE — 77067 SCR MAMMO BI INCL CAD: CPT | Performed by: STUDENT IN AN ORGANIZED HEALTH CARE EDUCATION/TRAINING PROGRAM

## 2023-09-15 NOTE — PROGRESS NOTES
SUBJECTIVE:   CC: Bette is an 52 year old who presents for preventive health visit.       Healthy Habits:     Getting at least 3 servings of Calcium per day:  Yes    Bi-annual eye exam:  Yes    Dental care twice a year:  Yes    Sleep apnea or symptoms of sleep apnea:  None    Diet:  Regular (no restrictions)    Frequency of exercise:  4-5 days/week    Duration of exercise:  45-60 minutes    Taking medications regularly:  Yes    Additional concerns today:  No    # Health Maintenance  - BP:   BP Readings from Last 3 Encounters:   09/18/23 102/68   08/26/22 97/71   03/30/22 108/79   - Cholesterol: pending  Recent Labs   Lab Test 08/26/22  1018   CHOL 188   HDL 82   LDL 92   TRIG 71   The ASCVD Risk score (Peter DK, et al., 2019) failed to calculate for the following reasons:    The systolic blood pressure is missing  - Diabetes Screening: pending  - Lung Cancer Screening: not indicated  55-79yo w/30py smoking history and currently smoking OR quit within past 15 years:  Low dose CT annually and discontinued once a person has been 15 years tobacco free  - (+) seatbelt use, (+) helmet, (+) smoke detector  - Feels safe at home, denies verbal/physical/emotional abuse in past year: yes  - Last Pap: 9/2021, repeat in 2024  - Colonoscopy: 6/2021, repeat in 2031  - Mammogram: 9/2023, repeat 2024      PROBLEMS TO ADD ON...  Frequent urination  - has always had this  - drinks a lot of water  - denies symptoms suspicious for UTI  - interested in checking blood sugars today  - no gross hematuria, no smell, no flank pain, fever, chills, nausea    Social History     Tobacco Use    Smoking status: Never    Smokeless tobacco: Never   Substance Use Topics    Alcohol use: Yes           8/24/2022     7:45 PM   Alcohol Use   Prescreen: >3 drinks/day or >7 drinks/week? No     Reviewed orders with patient.  Reviewed health maintenance and updated orders accordingly - Yes      Breast Cancer Screening:    FHS-7:       8/5/2021     3:27 PM  8/18/2022     3:22 PM 9/8/2023     2:22 PM   Breast CA Risk Assessment (FHS-7)   Did any of your first-degree relatives have breast or ovarian cancer? No No No   Did any of your relatives have bilateral breast cancer? Yes No No   Did any man in your family have breast cancer? No No No   Did any woman in your family have breast and ovarian cancer? Yes No No   Did any woman in your family have breast cancer before age 50 y? Yes No No   Do you have 2 or more relatives with breast and/or ovarian cancer? Yes No Yes   Do you have 2 or more relatives with breast and/or bowel cancer? Yes Yes No       Mammogram Screening: Recommended annual mammography  Pertinent mammograms are reviewed under the imaging tab.    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   HENT:  Negative for congestion, ear pain, hearing loss and sore throat.    Eyes:  Negative for pain and visual disturbance.   Respiratory:  Negative for cough and shortness of breath.    Cardiovascular:  Negative for chest pain, palpitations and peripheral edema.   Gastrointestinal:  Negative for abdominal pain, constipation, diarrhea,  "heartburn, hematochezia and nausea.   Breasts:  Negative for tenderness, breast mass and discharge.   Genitourinary:  Positive for frequency. Negative for dysuria, genital sores, hematuria, pelvic pain, urgency, vaginal bleeding and vaginal discharge.   Musculoskeletal:  Negative for arthralgias, joint swelling and myalgias.   Skin:  Negative for rash.   Neurological:  Negative for dizziness, weakness, headaches and paresthesias.   Psychiatric/Behavioral:  Negative for mood changes. The patient is nervous/anxious.           OBJECTIVE:   /68 (BP Location: Right arm, Patient Position: Sitting, Cuff Size: Adult Regular)   Pulse 74   Temp 97.9  F (36.6  C) (Skin)   Ht 1.647 m (5' 4.84\")   Wt 44.2 kg (97 lb 8 oz)   LMP 05/01/2019   SpO2 99%   BMI 16.30 kg/m    Physical Exam  GENERAL: healthy, alert and no distress  NECK: no adenopathy, no asymmetry, masses, or scars and thyroid normal to palpation  RESP: lungs clear to auscultation - no rales, rhonchi or wheezes  CV: regular rate and rhythm, normal S1 S2, no S3 or S4, no murmur, click or rub, no peripheral edema and peripheral pulses strong  ABDOMEN: soft, nontender, no hepatosplenomegaly, no masses and bowel sounds normal  MS: no gross musculoskeletal defects noted, no edema    Diagnostic Test Results:  Labs reviewed in Epic    ASSESSMENT/PLAN:   Bette was seen today for physical.    Diagnoses and all orders for this visit:    Routine history and physical examination of adult    Lipid screening  -     Lipid Profile; Future  -     Lipid Profile    Screening for metabolic disorder  -     Basic metabolic panel; Future  -     Hemoglobin A1c; Future  -     Basic metabolic panel  -     Hemoglobin A1c    Need for vaccination  -     INFLUENZA VACCINE IM > 6 MONTHS VALENT IIV4 (FLUZONE)  -     TDAP VACCINE (Adacel, Boostrix)  [[id number]]        Patient has been advised of split billing requirements and indicates understanding: Yes      COUNSELING:  Reviewed preventive " health counseling, as reflected in patient instructions        She reports that she has never smoked. She has never used smokeless tobacco.          MD LAURYN Pal [facility]

## 2023-09-17 ASSESSMENT — ENCOUNTER SYMPTOMS
NERVOUS/ANXIOUS: 1
JOINT SWELLING: 0
BREAST MASS: 0
NAUSEA: 0
HEMATURIA: 0
PALPITATIONS: 0
HEMATOCHEZIA: 0
FEVER: 0
CHILLS: 0
HEARTBURN: 0
ARTHRALGIAS: 0
CONSTIPATION: 0
SORE THROAT: 0
HEADACHES: 0
FREQUENCY: 1
DIZZINESS: 0
DYSURIA: 0
ABDOMINAL PAIN: 0
COUGH: 0
MYALGIAS: 0
SHORTNESS OF BREATH: 0
WEAKNESS: 0
PARESTHESIAS: 0
DIARRHEA: 0
EYE PAIN: 0

## 2023-09-18 ENCOUNTER — OFFICE VISIT (OUTPATIENT)
Dept: FAMILY MEDICINE | Facility: CLINIC | Age: 53
End: 2023-09-18
Payer: COMMERCIAL

## 2023-09-18 VITALS
OXYGEN SATURATION: 99 % | TEMPERATURE: 97.9 F | WEIGHT: 97.5 LBS | DIASTOLIC BLOOD PRESSURE: 68 MMHG | BODY MASS INDEX: 16.25 KG/M2 | HEART RATE: 74 BPM | SYSTOLIC BLOOD PRESSURE: 102 MMHG | HEIGHT: 65 IN

## 2023-09-18 DIAGNOSIS — Z13.220 LIPID SCREENING: ICD-10-CM

## 2023-09-18 DIAGNOSIS — Z23 NEED FOR VACCINATION: ICD-10-CM

## 2023-09-18 DIAGNOSIS — Z13.228 SCREENING FOR METABOLIC DISORDER: ICD-10-CM

## 2023-09-18 DIAGNOSIS — Z00.00 ROUTINE HISTORY AND PHYSICAL EXAMINATION OF ADULT: Primary | ICD-10-CM

## 2023-09-18 PROBLEM — M25.579 ANKLE PAIN: Status: RESOLVED | Noted: 2022-07-06 | Resolved: 2023-09-18

## 2023-09-18 LAB
ANION GAP SERPL CALCULATED.3IONS-SCNC: 12 MMOL/L (ref 7–15)
BUN SERPL-MCNC: 10.4 MG/DL (ref 6–20)
CALCIUM SERPL-MCNC: 9.5 MG/DL (ref 8.6–10)
CHLORIDE SERPL-SCNC: 99 MMOL/L (ref 98–107)
CHOLEST SERPL-MCNC: 164 MG/DL
CREAT SERPL-MCNC: 0.76 MG/DL (ref 0.51–0.95)
DEPRECATED HCO3 PLAS-SCNC: 28 MMOL/L (ref 22–29)
EGFRCR SERPLBLD CKD-EPI 2021: >90 ML/MIN/1.73M2
GLUCOSE SERPL-MCNC: 84 MG/DL (ref 70–99)
HBA1C MFR BLD: 5.1 % (ref 0–5.6)
HDLC SERPL-MCNC: 83 MG/DL
LDLC SERPL CALC-MCNC: 70 MG/DL
NONHDLC SERPL-MCNC: 81 MG/DL
POTASSIUM SERPL-SCNC: 4.1 MMOL/L (ref 3.4–5.3)
SODIUM SERPL-SCNC: 139 MMOL/L (ref 136–145)
TRIGL SERPL-MCNC: 54 MG/DL

## 2023-09-18 PROCEDURE — 80061 LIPID PANEL: CPT | Performed by: FAMILY MEDICINE

## 2023-09-18 PROCEDURE — 80048 BASIC METABOLIC PNL TOTAL CA: CPT | Performed by: FAMILY MEDICINE

## 2023-09-18 RX ORDER — ALPRAZOLAM 0.25 MG
0.25 TABLET ORAL
COMMUNITY
Start: 2023-03-10

## 2023-09-18 ASSESSMENT — ENCOUNTER SYMPTOMS
CONSTIPATION: 0
HEMATURIA: 0
CHILLS: 0
SORE THROAT: 0
EYE PAIN: 0
DIARRHEA: 0
BREAST MASS: 0
JOINT SWELLING: 0
PALPITATIONS: 0
NERVOUS/ANXIOUS: 1
DIZZINESS: 0
FREQUENCY: 1
HEADACHES: 0
DYSURIA: 0
PARESTHESIAS: 0
FEVER: 0
ABDOMINAL PAIN: 0
WEAKNESS: 0
ARTHRALGIAS: 0
MYALGIAS: 0
NAUSEA: 0
HEMATOCHEZIA: 0
SHORTNESS OF BREATH: 0
COUGH: 0
HEARTBURN: 0

## 2023-09-18 NOTE — NURSING NOTE
"Injectable Influenza Immunization Documentation    1.  Has the patient received the information for the injectable influenza vaccine? YES     2. Is the patient 6 months of age or older? YES     3. Does the patient have any of the following contraindications?         Severe allergy to eggs? No     Severe allergic reaction to previous influenza vaccines? No   Severe allergy to latex? No       History of Guillain-[place] syndrome? No     Currently have a temperature greater than 100.4F? No        4.  Severely egg allergic patients should have flu vaccine eligibility assessed by an MD, RN, or pharmacist, and those who received flu vaccine should be observed for 15 min by an MD, RN, Pharmacist, Medical Technician, or member of clinic staff.\": YES    5. Latex-allergic patients should be given latex-free influenza vaccine Yes. Please reference the Vaccine latex table to determine if your clinic s product is latex-containing.       Vaccination given by Eneida Morin LPN     "

## 2023-09-18 NOTE — NURSING NOTE
"52 year old    Chief Complaint   Patient presents with    Physical     Questions about vaccines and frequent urination            Blood pressure 102/68, pulse 74, temperature 97.9  F (36.6  C), temperature source Skin, height 1.647 m (5' 4.84\"), weight 44.2 kg (97 lb 8 oz), last menstrual period 05/01/2019, SpO2 99 %, not currently breastfeeding. Body mass index is 16.3 kg/m .    Patient Active Problem List   Diagnosis    Anxiety    Arnold-Chiari malformation (H)    Benign paroxysmal positional vertigo    Fatigue    Hypotension    Intracranial hemorrhage (H)    Ankle pain              Wt Readings from Last 2 Encounters:   09/18/23 44.2 kg (97 lb 8 oz)   08/26/22 44.5 kg (98 lb)       BP Readings from Last 3 Encounters:   09/18/23 102/68   08/26/22 97/71   03/30/22 108/79                Current Outpatient Medications   Medication    Calcium Citrate-Vitamin D (CALCIUM + D PO)    diazepam (VALIUM) 5 MG tablet    FLUoxetine 20 MG tablet    meclizine (ANTIVERT) 25 MG tablet    vitamin C with B complex (B COMPLEX-C) tablet    amitriptyline (ELAVIL) 10 MG tablet     No current facility-administered medications for this visit.              Social History     Tobacco Use    Smoking status: Never    Smokeless tobacco: Never   Substance Use Topics    Alcohol use: Not Currently     Comment: One drink every two months    Drug use: Never              Health Maintenance Due   Topic Date Due    HEPATITIS B IMMUNIZATION (1 of 3 - 3-dose series) Never done    HIV SCREENING  Never done    HEPATITIS C SCREENING  Never done    DTAP/TDAP/TD IMMUNIZATION (1 - Tdap) Never done    ZOSTER IMMUNIZATION (1 of 2) Never done    INFLUENZA VACCINE (1) 09/01/2023    YEARLY PREVENTIVE VISIT  10/06/2023            No results found for: PAP           September 18, 2023 1:03 PM    "

## 2023-09-18 NOTE — NURSING NOTE
Prior to immunization administration, verified patients identity using patient s name and date of birth. Please see Immunization Activity for additional information.     Screening Questionnaire for Adult Immunization    Are you sick today?   No   Do you have allergies to medications, food, a vaccine component or latex?   No   Have you ever had a serious reaction after receiving a vaccination?   No   Do you have a long-term health problem with heart, lung, kidney, or metabolic disease (e.g., diabetes), asthma, a blood disorder, no spleen, complement component deficiency, a cochlear implant, or a spinal fluid leak?  Are you on long-term aspirin therapy?   No   Do you have cancer, leukemia, HIV/AIDS, or any other immune system problem?   No   Do you have a parent, brother, or sister with an immune system problem?   No   In the past 3 months, have you taken medications that affect  your immune system, such as prednisone, other steroids, or anticancer drugs; drugs for the treatment of rheumatoid arthritis, Crohn s disease, or psoriasis; or have you had radiation treatments?   No   Have you had a seizure, or a brain or other nervous system problem?   No   During the past year, have you received a transfusion of blood or blood    products, or been given immune (gamma) globulin or antiviral drug?   No   For women: Are you pregnant or is there a chance you could become       pregnant during the next month?   No   Have you received any vaccinations in the past 4 weeks?   No     Immunization questionnaire answers were all negative.      Patient instructed to remain in clinic for 15 minutes afterwards, and to report any adverse reactions.     Screening performed by Eneida Morin LPN on 9/18/2023 at 1:38 PM.

## 2024-04-12 ENCOUNTER — MYC MEDICAL ADVICE (OUTPATIENT)
Dept: FAMILY MEDICINE | Facility: CLINIC | Age: 54
End: 2024-04-12

## 2024-04-12 DIAGNOSIS — I62.9 INTRACRANIAL HEMORRHAGE (H): Primary | ICD-10-CM

## 2024-04-12 NOTE — TELEPHONE ENCOUNTER
Order placed for OT driving eval. Faxed to number provided by patient - [phone].    Gilberto TEJEDA, RN  04/12/24 3:06 PM

## 2024-09-26 SDOH — HEALTH STABILITY: PHYSICAL HEALTH: ON AVERAGE, HOW MANY MINUTES DO YOU ENGAGE IN EXERCISE AT THIS LEVEL?: 40 MIN

## 2024-09-26 SDOH — HEALTH STABILITY: PHYSICAL HEALTH: ON AVERAGE, HOW MANY DAYS PER WEEK DO YOU ENGAGE IN MODERATE TO STRENUOUS EXERCISE (LIKE A BRISK WALK)?: 5 DAYS

## 2024-09-26 ASSESSMENT — SOCIAL DETERMINANTS OF HEALTH (SDOH): HOW OFTEN DO YOU GET TOGETHER WITH FRIENDS OR RELATIVES?: THREE TIMES A WEEK

## 2024-09-27 ENCOUNTER — ANCILLARY PROCEDURE (OUTPATIENT)
Dept: MAMMOGRAPHY | Facility: CLINIC | Age: 54
End: 2024-09-27
Attending: FAMILY MEDICINE
Payer: COMMERCIAL

## 2024-09-27 DIAGNOSIS — Z12.31 VISIT FOR SCREENING MAMMOGRAM: ICD-10-CM

## 2024-09-27 PROCEDURE — 77063 BREAST TOMOSYNTHESIS BI: CPT | Mod: GC | Performed by: RADIOLOGY

## 2024-09-27 PROCEDURE — 77067 SCR MAMMO BI INCL CAD: CPT | Mod: GC | Performed by: RADIOLOGY

## 2024-10-01 ENCOUNTER — OFFICE VISIT (OUTPATIENT)
Dept: FAMILY MEDICINE | Facility: CLINIC | Age: 54
End: 2024-10-01
Payer: COMMERCIAL

## 2024-10-01 VITALS
DIASTOLIC BLOOD PRESSURE: 64 MMHG | BODY MASS INDEX: 15.74 KG/M2 | HEIGHT: 65 IN | OXYGEN SATURATION: 99 % | HEART RATE: 70 BPM | WEIGHT: 94.5 LBS | SYSTOLIC BLOOD PRESSURE: 97 MMHG | TEMPERATURE: 97.3 F

## 2024-10-01 DIAGNOSIS — Z12.4 SCREENING FOR MALIGNANT NEOPLASM OF CERVIX: ICD-10-CM

## 2024-10-01 DIAGNOSIS — Z23 HIGH PRIORITY FOR 2019-NCOV VACCINE: ICD-10-CM

## 2024-10-01 DIAGNOSIS — Z00.00 WELLNESS EXAMINATION: Primary | ICD-10-CM

## 2024-10-01 DIAGNOSIS — Z23 NEED FOR PROPHYLACTIC VACCINATION AND INOCULATION AGAINST INFLUENZA: ICD-10-CM

## 2024-10-01 DIAGNOSIS — Z13.228 SCREENING FOR METABOLIC DISORDER: ICD-10-CM

## 2024-10-01 DIAGNOSIS — F41.9 ANXIETY: ICD-10-CM

## 2024-10-01 DIAGNOSIS — Z13.220 LIPID SCREENING: ICD-10-CM

## 2024-10-01 LAB
ANION GAP SERPL CALCULATED.3IONS-SCNC: 11 MMOL/L (ref 7–15)
BUN SERPL-MCNC: 12.7 MG/DL (ref 6–20)
CALCIUM SERPL-MCNC: 9.5 MG/DL (ref 8.8–10.4)
CHLORIDE SERPL-SCNC: 101 MMOL/L (ref 98–107)
CHOLEST SERPL-MCNC: 182 MG/DL
CREAT SERPL-MCNC: 0.75 MG/DL (ref 0.51–0.95)
EGFRCR SERPLBLD CKD-EPI 2021: >90 ML/MIN/1.73M2
FASTING STATUS PATIENT QL REPORTED: NO
FASTING STATUS PATIENT QL REPORTED: NO
GLUCOSE SERPL-MCNC: 81 MG/DL (ref 70–99)
HCO3 SERPL-SCNC: 26 MMOL/L (ref 22–29)
HDLC SERPL-MCNC: 92 MG/DL
LDLC SERPL CALC-MCNC: 79 MG/DL
NONHDLC SERPL-MCNC: 90 MG/DL
POTASSIUM SERPL-SCNC: 4.6 MMOL/L (ref 3.4–5.3)
SODIUM SERPL-SCNC: 138 MMOL/L (ref 135–145)
TRIGL SERPL-MCNC: 56 MG/DL

## 2024-10-01 PROCEDURE — 80048 BASIC METABOLIC PNL TOTAL CA: CPT | Performed by: FAMILY MEDICINE

## 2024-10-01 PROCEDURE — 80061 LIPID PANEL: CPT | Performed by: FAMILY MEDICINE

## 2024-10-01 RX ORDER — ESTRADIOL 0.1 MG/G
CREAM VAGINAL
COMMUNITY
Start: 2023-10-25

## 2024-10-01 RX ORDER — VITAMIN B COMPLEX
1 TABLET ORAL DAILY
COMMUNITY

## 2024-10-01 RX ORDER — FLUOXETINE 40 MG/1
40 CAPSULE ORAL DAILY
Status: SHIPPED
Start: 2024-10-01

## 2024-10-01 NOTE — PROGRESS NOTES
Preventive Care Visit  [facility]  Alexis Chatman MD, Family Medicine  Oct 1, 2024      Assessment & Plan   Problem List Items Addressed This Visit          Other    Anxiety    Relevant Medications    FLUoxetine (PROZAC) 40 MG capsule     Other Visit Diagnoses       Wellness examination    -  Primary    Lipid screening        Relevant Orders    Lipid Profile    Screening for metabolic disorder        Relevant Orders    Basic metabolic panel    Screening for malignant neoplasm of cervix        Need for prophylactic vaccination and inoculation against influenza        Relevant Orders    INFLUENZA VACCINE,SPLIT VIRUS,TRIVALENT,PF(FLUZONE) (Completed)    High priority for 2019-nCoV vaccine        Relevant Orders    COVID-19 12+ (MODERNA) (Completed)           Basic labs and vaccinations as noted above. Will follow up lab results as indicated.     Patient has been advised of split billing requirements and indicates understanding: Yes       Counseling  Appropriate preventive services were addressed with this patient via screening, questionnaire, or discussion as appropriate for fall prevention, nutrition, physical activity, Tobacco-use cessation, social engagement, weight loss and cognition.  Checklist reviewing preventive services available has been given to the patient.  Reviewed patient's diet, addressing concerns and/or questions.   She is at risk for psychosocial distress and has been provided with information to reduce risk.     Alexis Chatman MD  10:45 AM, October 1, 2024        Haydee Amos is a 53 year old, presenting for the following:  Physical       Health Care Directive  Patient does not have a Health Care Directive or Living Will    HPI  # Health Maintenance  - BP:   BP Readings from Last 3 Encounters:   10/01/24 97/64   09/18/23 102/68   08/26/22 97/71   - Cholesterol: pending  Recent Labs   Lab Test 09/18/23  1349   CHOL 164   HDL 83   LDL 70   TRIG 54   The 10-year ASCVD risk score (Peter POLO,  et al., 2019) is: 0.6%    Values used to calculate the score:      Age: 53 years      Sex: Female      Is Non- : No      Diabetic: No      Tobacco smoker: No      Systolic Blood Pressure: 97 mmHg      Is BP treated: No      HDL Cholesterol: 83 mg/dL      Total Cholesterol: 164 mg/dL  - Diabetes Screening: pending  - Lung Cancer Screening: not indicated  55-81yo w/30py smoking history and currently smoking OR quit within past 15 years:  Low dose CT annually and discontinued once a person has been 15 years tobacco free  - (+) seatbelt use, (+) helmet, (+) smoke detector  - Feels safe at home, denies verbal/physical/emotional abuse in past year: yes  - Last Pap: 2021, repeat in 2026, has OBGYN who manages this  - Colonoscopy: 2021, repeat in 2031  - Mammogram: 2024 repeat in 1-2 years        9/26/2024   General Health   How would you rate your overall physical health? Good   Feel stress (tense, anxious, or unable to sleep) Only a little      (!) STRESS CONCERN      9/26/2024   Nutrition   Three or more servings of calcium each day? Yes   Diet: Regular (no restrictions)   How many servings of fruit and vegetables per day? (!) 2-3   How many sweetened beverages each day? 0-1            9/26/2024   Exercise   Days per week of moderate/strenous exercise 5 days   Average minutes spent exercising at this level 40 min            9/26/2024   Social Factors   Frequency of gathering with friends or relatives Three times a week   Worry food won't last until get money to buy more No   Food not last or not have enough money for food? No   Do you have housing? (Housing is defined as stable permanent housing and does not include staying ouside in a car, in a tent, in an abandoned building, in an overnight shelter, or couch-surfing.) Yes   Are you worried about losing your housing? No   Lack of transportation? No   Unable to get utilities (heat,electricity)? Yes   Want help with housing or utility concern? No       (!) FINANCIAL RESOURCE STRAIN CONCERN      9/26/2024   Fall Risk   Fallen 2 or more times in the past year? No   Trouble with walking or balance? No             9/26/2024   Dental   Dentist two times every year? Yes            9/26/2024   TB Screening   Were you born outside of the [place]? No            Today's PHQ-2 Score:       9/30/2024    12:15 PM   PHQ-2 ( 1999 [employer])   Q1: Little interest or pleasure in doing things 1   Q2: Feeling down, depressed or hopeless 1   PHQ-2 Score 2   Q1: Little interest or pleasure in doing things Several days   Q2: Feeling down, depressed or hopeless Several days   PHQ-2 Score 2           9/26/2024   Substance Use   Alcohol more than 3/day or more than 7/wk No   Do you use any other substances recreationally? No        Social History     Tobacco Use    Smoking status: Never    Smokeless tobacco: Never   Substance Use Topics    Alcohol use: Not Currently     Comment: One drink every two months    Drug use: Never           9/27/2024   LAST FHS-7 RESULTS   1st degree relative breast or ovarian cancer No   Any relative bilateral breast cancer No   Any male have breast cancer No   Any ONE woman have BOTH breast AND ovarian cancer No   Any woman with breast cancer before 50yrs Yes   2 or more relatives with breast AND/OR bowel cancer No        Mammogram Screening - Mammogram every 1-2 years updated in Health Maintenance based on mutual decision making          9/26/2024   One time HIV Screening   Previous HIV test? I don't know          9/26/2024   STI Screening   New sexual partner(s) since last STI/HIV test? 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     Objective    Exam  BP 97/64   Pulse 70   Temp 97.3  F (36.3  C)   Ht 1.65 m (5' 4.96\")   Wt 42.9 kg (94 lb 8 oz)   LMP 05/01/2019   SpO2 99%   BMI 15.74 kg/m     Estimated body mass index is 15.74 kg/m  as calculated from the following:    Height as of this encounter: 1.65 m (5' 4.96\").    Weight as of this encounter: 42.9 kg (94 lb 8 oz).    Physical Exam  GENERAL: alert and no distress  NECK: no adenopathy, no asymmetry, masses, or scars  RESP: lungs clear to auscultation - no rales, rhonchi or wheezes  CV: regular rate and rhythm, normal S1 S2, no S3 or S4, no murmur, click or rub, no peripheral edema  ABDOMEN: soft, nontender, no hepatosplenomegaly, no masses and bowel sounds normal  MS: no gross musculoskeletal defects noted, no edema        Signed Electronically by: Alexis Chatman MD    "

## 2025-06-23 ENCOUNTER — TELEPHONE (OUTPATIENT)
Dept: FAMILY MEDICINE | Facility: CLINIC | Age: 55
End: 2025-06-23

## 2025-06-23 NOTE — TELEPHONE ENCOUNTER
"Called Bette who states her urine feels \"warmer\" than normal and her bladder has felt a little more full than normal after voiding, like she could void again.  Reviewed most common signs and symptoms of UTI and also Cystitis.  Bette will continue to monitor her symptoms, cut back on her caffeine and hydrate.  If her symptoms do not improve, she will call back for an appointment.  JOSE ANTONIO SultanaN, RN, [facility]  RN Care Coordinator  [facility]  06/23/25  4:13 PM  Phone: [phone]      "

## 2025-06-23 NOTE — TELEPHONE ENCOUNTER
Symptoms (route to triage team)    Who is calling - Bette   What is the symptom/s being reported - possible UTI   When did the symptom/s begin - Ongoing for a couple weeks.   Additional comments/details - Patient is unsure whether or not if she needs to come in. Pt has never had UTI  Same day office visit offered? FD did tell patient that making an appointment would be beneficial, and she insisted that she would like to speak with an RN first.   Advised patient that RN will call back within 3 hours? No  Ok to leave a message on VM? Yes